# Patient Record
Sex: FEMALE | Race: WHITE | NOT HISPANIC OR LATINO | Employment: FULL TIME | URBAN - METROPOLITAN AREA
[De-identification: names, ages, dates, MRNs, and addresses within clinical notes are randomized per-mention and may not be internally consistent; named-entity substitution may affect disease eponyms.]

---

## 2021-09-15 ENCOUNTER — EVALUATION (OUTPATIENT)
Dept: PHYSICAL THERAPY | Facility: CLINIC | Age: 36
End: 2021-09-15
Payer: COMMERCIAL

## 2021-09-15 DIAGNOSIS — G89.29 CHRONIC PAIN OF LEFT KNEE: Primary | ICD-10-CM

## 2021-09-15 DIAGNOSIS — M25.562 CHRONIC PAIN OF LEFT KNEE: Primary | ICD-10-CM

## 2021-09-15 DIAGNOSIS — M70.50 PES ANSERINE BURSITIS: ICD-10-CM

## 2021-09-15 PROCEDURE — 97110 THERAPEUTIC EXERCISES: CPT | Performed by: PHYSICAL THERAPIST

## 2021-09-15 PROCEDURE — 97161 PT EVAL LOW COMPLEX 20 MIN: CPT | Performed by: PHYSICAL THERAPIST

## 2021-09-15 RX ORDER — LEVOTHYROXINE SODIUM 0.07 MG/1
88 TABLET ORAL DAILY
COMMUNITY
End: 2021-11-01

## 2021-09-15 RX ORDER — SPIRONOLACTONE 25 MG/1
50 TABLET ORAL
COMMUNITY
End: 2022-03-24 | Stop reason: DRUGHIGH

## 2021-09-15 RX ORDER — NORTRIPTYLINE HYDROCHLORIDE 10 MG/1
CAPSULE ORAL
COMMUNITY

## 2021-09-15 NOTE — PROGRESS NOTES
PT Evaluation     Today's date: 9/15/2021  Patient name: Dee Dee Connor  : 1985  MRN: [id number]  Referring provider: No ref  provider found  Dx:   Encounter Diagnosis     ICD-10-CM    1  Chronic pain of left knee  M25 562     G89 29    2  Pes anserine bursitis  M70 50                   Assessment  Assessment details: Dee Dee Connor is a 37 y o  female who presents with complaints of left knee pain and signs and symptoms consistent with pes anserine tendonitis and patellofemoral syndrome  Patient demonstrates very mild decrease in LE strength, mild decreased flexibility, decreased balance, decreased activity tolerance and decreased function  Patient would benefit from skilled physical therapy in order to address said deficits and improve functional mobility     Impairments: abnormal gait, abnormal or restricted ROM, abnormal movement, activity intolerance, impaired balance, impaired physical strength, lacks appropriate home exercise program, pain with function, weight-bearing intolerance and poor posture   Understanding of Dx/Px/POC: good   Prognosis: good    Goals  STG:  Decrease pain 1 grade  Independent with HEP  Pt will run 1 mile without difficulty     LTG:  Decrease pain 2 grades  Increase FOTO to expected score  Pt will be able to run up to 2 miles without difficulty   Negotiate stairs without pain   Pt will report no pain post workout    Plan  Patient would benefit from: skilled physical therapy  Planned therapy interventions: abdominal trunk stabilization, manual therapy, activity modification, joint mobilization, massage, neuromuscular re-education, patient education, postural training, balance, stretching, strengthening, therapeutic activities, therapeutic exercise, flexibility, gait training, functional ROM exercises and home exercise program  Frequency: 2x week  Duration in visits: 8  Treatment plan discussed with: patient        Subjective Evaluation    History of Present Illness  Mechanism of injury: Patient reports chronic left knee pain especially after 1 hour or > of activity and notices its worse mainly the next day  Increased pain mostly going down steps but sometimes it is going up stairs ( can perform reciprocally)   Denies popping, clicking, catching, giving way  Has had Xrays previously, no MRI  Not preventing from sleeping  Denies n/t, - bowl/bladder changes  No prior injection    Prior history of labral repair on hip 2017 on left side, and left foot surgery metatarsal bone cyst removal  With pain high on balls of feet  1 hour biking/wk mostly flat > 90 mins or HIIT  Has had 4 weeks of PT previously  She feels slight improvement in knee pain with PT recently  Pain  Current pain ratin  At worst pain ratin    Patient Goals  Patient goals for therapy: decreased pain and independence with ADLs/IADLs  Patient goal: Reume working wout without restriction, resume running 3-4, increasing intesity of hiking or biking        Objective     Tenderness   Left Knee   Tenderness in the inferior patella, lateral patella, pes anserinus, quadriceps tendon and superior patella  Additional Tenderness Details  Tender at ITB  On left     Active Range of Motion   Left Knee   Normal active range of motion    Right Knee   Normal active range of motion    Additional Active Range of Motion Details  Mild quad tightness b/l no pain  Min hamstring tightness b/l     Strength/Myotome Testing     Left Knee   Normal strength    Right Knee   Normal strength    Additional Strength Details  SLR flexion 4/5              Precautions: None         Manuals 9/15            Laser to pes anserine and quad tendon  NV            graston to pes anserine and  Quad tendon NV            therabar roller to ITB NV            McConnel Taping?              Neuro Re-Ed              y balance NV            steamboats             SLS foam NV            Eccentric step down with TB vlagus pull Ther Ex             Bike/TM NV            ITB strap stretch NV            Prone quad stretch strap NV            glute bridge with band  NV            Piriformis knee to shoulder stretch NV            Clamshells  NV            SLR flexion NV            SLR extension NV            Side step TB NV            Knee ext machine             Ham curl machine             Leg press with TB             Modified pistol at high low                                       Ther Activity             Slider reverse lunges             Slider lateral lunges                                                    Modalities

## 2021-09-17 ENCOUNTER — OFFICE VISIT (OUTPATIENT)
Dept: PHYSICAL THERAPY | Facility: CLINIC | Age: 36
End: 2021-09-17
Payer: COMMERCIAL

## 2021-09-17 DIAGNOSIS — M70.50 PES ANSERINE BURSITIS: ICD-10-CM

## 2021-09-17 DIAGNOSIS — M25.562 CHRONIC PAIN OF LEFT KNEE: Primary | ICD-10-CM

## 2021-09-17 DIAGNOSIS — G89.29 CHRONIC PAIN OF LEFT KNEE: Primary | ICD-10-CM

## 2021-09-17 PROCEDURE — 97110 THERAPEUTIC EXERCISES: CPT | Performed by: PHYSICAL THERAPIST

## 2021-09-17 PROCEDURE — 97140 MANUAL THERAPY 1/> REGIONS: CPT | Performed by: PHYSICAL THERAPIST

## 2021-09-17 PROCEDURE — 97112 NEUROMUSCULAR REEDUCATION: CPT | Performed by: PHYSICAL THERAPIST

## 2021-09-17 NOTE — PROGRESS NOTES
Daily Note     Today's date: 2021  Patient name: Arianna   : 1985  MRN: [id number]  Referring provider: Mimi Crisostomo  Dx:   Encounter Diagnosis     ICD-10-CM    1  Chronic pain of left knee  M25 562     G89 29    2  Pes anserine bursitis  M70 50                   Subjective: Patient reports no pain after IE or difficulty with HEP  Objective: See treatment diary below      Assessment: Tolerated treatment well  Patient would benefit from continued PT  She has good form with all exercises, minimal instability with balance exercise and appears fairly equal bilaterally  Increased lateral hip/glute fatigue with strengthening exercises today  No excessive heat or discomfort with manuals  Continue progressing as tolerated  Plan: Progress treatment as tolerated  Precautions: None         Manuals 9/15 9/17           Laser to pes anserine and quad tendon  NV 3'           graston to pes anserine and  Quad tendon NV 8'           therabar roller to ITB  NV           McConnel Taping?              Neuro Re-Ed              y balance NV 5 x bl            steamboats             SLS foam NV 15" x5 bl biodex foam            Eccentric step down with TB vlagus pull                                                    Ther Ex             Bike/TM NV 5' lvl 5            ITB strap stretch NV            Prone quad stretch strap NV            glute bridge with band  NV  G TB 2x10            Piriformis knee to shoulder stretch NV 20" x3           Clamshells  NV 2x10 G TB            SLR flexion NV            SLR extension NV            Side step TB NV G TB   1'           Knee ext machine             Ham curl machine             Leg press with TB             Modified pistol at high low             Stork to chair                           Ther Activity             Slider reverse lunges             Slider lateral lunges                                                    Modalities

## 2021-09-22 ENCOUNTER — OFFICE VISIT (OUTPATIENT)
Dept: PHYSICAL THERAPY | Facility: CLINIC | Age: 36
End: 2021-09-22
Payer: COMMERCIAL

## 2021-09-22 DIAGNOSIS — G89.29 CHRONIC PAIN OF LEFT KNEE: Primary | ICD-10-CM

## 2021-09-22 DIAGNOSIS — M70.50 PES ANSERINE BURSITIS: ICD-10-CM

## 2021-09-22 DIAGNOSIS — M25.562 CHRONIC PAIN OF LEFT KNEE: Primary | ICD-10-CM

## 2021-09-22 PROCEDURE — 97140 MANUAL THERAPY 1/> REGIONS: CPT

## 2021-09-22 PROCEDURE — 97112 NEUROMUSCULAR REEDUCATION: CPT

## 2021-09-22 PROCEDURE — 97110 THERAPEUTIC EXERCISES: CPT

## 2021-09-22 NOTE — PROGRESS NOTES
Daily Note     Today's date: 2021  Patient name: Bhanu Salazar  : 1985  MRN: [id number]  Referring provider: Anirudh Rizvi  Dx:   Encounter Diagnosis     ICD-10-CM    1  Chronic pain of left knee  M25 562     G89 29    2  Pes anserine bursitis  M70 50                   Subjective: Patient reports that she is doing well today with no complaints of pain  Notes that she felt good following last session  Objective: See treatment diary below      Assessment: Tolerated treatment well  Patient would benefit from continued PT  Continued to focus on glute strengthening this session with muscular fatigue and weakness present  Added in active leg raises being challenged but able to complete  Educated pt on DOMS with compliance present  Fatigued to end with minimal soreness over L adductors  Plan: Progress treatment as tolerated  Precautions: None         Manuals 9/15 9/17 9/22          Laser to pes anserine and quad tendon  NV 3' 3'          graston to pes anserine and  Quad tendon NV 8' 8'          therabar roller to ITB  NV 4'          McConnel Taping?              Neuro Re-Ed              y balance NV 5 x bl            steamboats             SLS foam NV 15" x5 bl biodex foam  15"x5 bl biodex foam          Eccentric step down with TB vlagus pull                                                    Ther Ex             Bike/TM NV 5' lvl 5  5' lv 5          ITB strap stretch NV            Prone quad stretch strap NV            glute bridge with band  NV  GTB 2x10  GTB 2x10          Piriformis knee to shoulder stretch NV 20" x3 20"x3          Clamshells  NV 2x10 G TB  2x10 GTB          SLR flexion NV  2# 2x10          SLR extension NV  2# 2x10          Side step TB NV G TB   1' GTB 4 laps 20'          Knee ext machine             Ham curl machine             Leg press with TB             Modified pistol at high low             Stork to chair                           Ther Activity Slider reverse lunges             Slider lateral lunges                                                    Modalities

## 2021-09-24 ENCOUNTER — OFFICE VISIT (OUTPATIENT)
Dept: PHYSICAL THERAPY | Facility: CLINIC | Age: 36
End: 2021-09-24
Payer: COMMERCIAL

## 2021-09-24 DIAGNOSIS — M25.562 CHRONIC PAIN OF LEFT KNEE: Primary | ICD-10-CM

## 2021-09-24 DIAGNOSIS — G89.29 CHRONIC PAIN OF LEFT KNEE: Primary | ICD-10-CM

## 2021-09-24 DIAGNOSIS — M70.50 PES ANSERINE BURSITIS: ICD-10-CM

## 2021-09-24 PROCEDURE — 97140 MANUAL THERAPY 1/> REGIONS: CPT

## 2021-09-24 PROCEDURE — 97110 THERAPEUTIC EXERCISES: CPT

## 2021-09-24 PROCEDURE — 97112 NEUROMUSCULAR REEDUCATION: CPT

## 2021-09-24 NOTE — PROGRESS NOTES
Daily Note     Today's date: 2021  Patient name: Juancarlos Garcia  : 1985  MRN: [id number]  Referring provider: Bib Lugo  Dx:   Encounter Diagnosis     ICD-10-CM    1  Chronic pain of left knee  M25 562     G89 29    2  Pes anserine bursitis  M70 50                   Subjective: Pt reports no increased sx following last visit  Objective: See treatment diary below      Assessment: Tolerated treatment well  Patient exhibited good technique with therapeutic exercises  Pt defers resistance w/ SLR due to L hip weakness, discomfort  TTP persists L pes anserine, distal adductors, mid ITB  Plan: Progress treatment as tolerated  Precautions: None         Manuals 9/15 9/17 9/22 9/24         Laser to pes anserine and quad tendon  NV 3' 3' 3'         graston to pes anserine and  Quad tendon NV 8' 8' 8'         therabar roller to ITB  NV 4' 4'         McConnel Taping?              Neuro Re-Ed              y balance NV 5 x bl   NV?         steamboats             SLS foam NV 15" x5 bl biodex foam  15"x5 bl biodex foam 15"x3 bl biodex foam         Eccentric step down with TB vlagus pull                                                    Ther Ex             Bike/TM NV 5' lvl 5  5' lv 5 5' lv 5         ITB strap stretch NV            Prone quad stretch strap NV            glute bridge with band  NV  GTB 2x10  GTB 2x10 GTB 2x10         Piriformis knee to shoulder stretch NV 20" x3 20"x3 20"x3         Clamshells  NV 2x10 G TB  2x10 GTB GTb 2x10         SLR flexion NV  2# 2x10 0# 2x10 ea         SLR extension NV  2# 2x10 2# 2x10         Side step TB NV G TB   1' GTB 4 laps 20' GTb 4 laps          Knee ext machine             Ham curl machine             Leg press with TB             Modified pistol at high low             Stork to chair                           Ther Activity             Slider reverse lunges             Slider lateral lunges Modalities

## 2021-09-29 ENCOUNTER — OFFICE VISIT (OUTPATIENT)
Dept: PHYSICAL THERAPY | Facility: CLINIC | Age: 36
End: 2021-09-29
Payer: COMMERCIAL

## 2021-09-29 DIAGNOSIS — G89.29 CHRONIC PAIN OF LEFT KNEE: Primary | ICD-10-CM

## 2021-09-29 DIAGNOSIS — M70.50 PES ANSERINE BURSITIS: ICD-10-CM

## 2021-09-29 DIAGNOSIS — M25.562 CHRONIC PAIN OF LEFT KNEE: Primary | ICD-10-CM

## 2021-09-29 PROCEDURE — 97110 THERAPEUTIC EXERCISES: CPT | Performed by: PHYSICAL THERAPIST

## 2021-09-29 PROCEDURE — 97140 MANUAL THERAPY 1/> REGIONS: CPT | Performed by: PHYSICAL THERAPIST

## 2021-09-29 PROCEDURE — 97112 NEUROMUSCULAR REEDUCATION: CPT | Performed by: PHYSICAL THERAPIST

## 2021-09-29 NOTE — PROGRESS NOTES
Daily Note     Today's date: 2021  Patient name: Jarrod Ruiz  : 1985  MRN: [id number]  Referring provider: Aleja Figueredo  Dx:   Encounter Diagnosis     ICD-10-CM    1  Chronic pain of left knee  M25 562     G89 29    2  Pes anserine bursitis  M70 50                   Subjective: Patient reports she has gone into a 12 mile bike ride, pretty flat without any pain  She has not done any running yet  Objective: See treatment diary below      Assessment: Tolerated treatment well  Patient exhibited good technique with therapeutic exercises  Patient was challenged by addition of steamboats today  She demonstrates good balance with Storks and had no pain to medial knee with Graston  patient was sore at adductors, added in adductor stretching to facilitate loosening of muscles   Plan: Progress treatment as tolerated  Precautions: None         Manuals 9/15 9/17 9/22 9/24 9/29        Laser to pes anserine and quad tendon  NV 3' 3' 3' 3'        graston to pes anserine and  Quad tendon NV 8' 8' 8' 8'        therabar roller to ITB  NV 4' 4'         Adductor stretching     3'        McConnel Taping?              Neuro Re-Ed              y balance NV 5 x bl   NV?         steamboats     R TB x10 4 way        SLS foam NV 15" x5 bl biodex foam  15"x5 bl biodex foam 15"x3 bl biodex foam         Eccentric step down with TB vlagus pull                                                    Ther Ex             Bike/TM NV 5' lvl 5  5' lv 5 5' lv 5 5' lvl 5         ITB strap stretch NV            Prone quad stretch strap NV            glute bridge with band  NV  GTB 2x10  GTB 2x10 GTB 2x10 HEP        Piriformis knee to shoulder stretch NV 20" x3 20"x3 20"x3         Clamshells  NV 2x10 G TB  2x10 GTB GTb 2x10 HEP         SLR flexion NV  2# 2x10 0# 2x10 ea         SLR extension NV  2# 2x10 2# 2x10         Side step TB NV G TB   1' GTB 4 laps 20' GTb 4 laps  HEP        Knee ext machine     22# x15        Ham curl machine     22# x15        Leg press with TB             Modified pistol at high low             Stork to chair      2x10                      Ther Activity             Slider reverse lunges             Slider lateral lunges                                                    Modalities

## 2021-10-01 ENCOUNTER — OFFICE VISIT (OUTPATIENT)
Dept: PHYSICAL THERAPY | Facility: CLINIC | Age: 36
End: 2021-10-01
Payer: COMMERCIAL

## 2021-10-01 DIAGNOSIS — M25.562 CHRONIC PAIN OF LEFT KNEE: Primary | ICD-10-CM

## 2021-10-01 DIAGNOSIS — M70.50 PES ANSERINE BURSITIS: ICD-10-CM

## 2021-10-01 DIAGNOSIS — G89.29 CHRONIC PAIN OF LEFT KNEE: Primary | ICD-10-CM

## 2021-10-01 PROCEDURE — 97140 MANUAL THERAPY 1/> REGIONS: CPT

## 2021-10-01 PROCEDURE — 97110 THERAPEUTIC EXERCISES: CPT

## 2021-10-01 PROCEDURE — 97112 NEUROMUSCULAR REEDUCATION: CPT

## 2021-10-05 ENCOUNTER — OFFICE VISIT (OUTPATIENT)
Dept: PHYSICAL THERAPY | Facility: CLINIC | Age: 36
End: 2021-10-05
Payer: COMMERCIAL

## 2021-10-05 ENCOUNTER — APPOINTMENT (OUTPATIENT)
Dept: LAB | Facility: CLINIC | Age: 36
End: 2021-10-05
Payer: COMMERCIAL

## 2021-10-05 DIAGNOSIS — G89.29 CHRONIC PAIN OF LEFT KNEE: Primary | ICD-10-CM

## 2021-10-05 DIAGNOSIS — M25.562 CHRONIC PAIN OF LEFT KNEE: Primary | ICD-10-CM

## 2021-10-05 DIAGNOSIS — M70.50 PES ANSERINE BURSITIS: ICD-10-CM

## 2021-10-05 DIAGNOSIS — Z79.899 ENCOUNTER FOR LONG-TERM (CURRENT) USE OF OTHER MEDICATIONS: ICD-10-CM

## 2021-10-05 LAB
ALBUMIN SERPL BCP-MCNC: 3.7 G/DL (ref 3.5–5)
ALP SERPL-CCNC: 55 U/L (ref 46–116)
ALT SERPL W P-5'-P-CCNC: 16 U/L (ref 12–78)
ANION GAP SERPL CALCULATED.3IONS-SCNC: 4 MMOL/L (ref 4–13)
AST SERPL W P-5'-P-CCNC: 14 U/L (ref 5–45)
BILIRUB SERPL-MCNC: 0.53 MG/DL (ref 0.2–1)
BUN SERPL-MCNC: 10 MG/DL (ref 5–25)
CALCIUM SERPL-MCNC: 9.4 MG/DL (ref 8.3–10.1)
CHLORIDE SERPL-SCNC: 105 MMOL/L (ref 100–108)
CO2 SERPL-SCNC: 29 MMOL/L (ref 21–32)
CREAT SERPL-MCNC: 0.94 MG/DL (ref 0.6–1.3)
GFR SERPL CREATININE-BSD FRML MDRD: 79 ML/MIN/1.73SQ M
GLUCOSE P FAST SERPL-MCNC: 86 MG/DL (ref 65–99)
POTASSIUM SERPL-SCNC: 3.7 MMOL/L (ref 3.5–5.3)
PROT SERPL-MCNC: 7.2 G/DL (ref 6.4–8.2)
SODIUM SERPL-SCNC: 138 MMOL/L (ref 136–145)

## 2021-10-05 PROCEDURE — 97116 GAIT TRAINING THERAPY: CPT | Performed by: PHYSICAL THERAPIST

## 2021-10-05 PROCEDURE — 97140 MANUAL THERAPY 1/> REGIONS: CPT | Performed by: PHYSICAL THERAPIST

## 2021-10-05 PROCEDURE — 97110 THERAPEUTIC EXERCISES: CPT | Performed by: PHYSICAL THERAPIST

## 2021-10-05 PROCEDURE — 80053 COMPREHEN METABOLIC PANEL: CPT

## 2021-10-05 PROCEDURE — 36415 COLL VENOUS BLD VENIPUNCTURE: CPT

## 2021-10-08 ENCOUNTER — OFFICE VISIT (OUTPATIENT)
Dept: PHYSICAL THERAPY | Facility: CLINIC | Age: 36
End: 2021-10-08
Payer: COMMERCIAL

## 2021-10-08 DIAGNOSIS — M25.562 CHRONIC PAIN OF LEFT KNEE: Primary | ICD-10-CM

## 2021-10-08 DIAGNOSIS — M70.50 PES ANSERINE BURSITIS: ICD-10-CM

## 2021-10-08 DIAGNOSIS — G89.29 CHRONIC PAIN OF LEFT KNEE: Primary | ICD-10-CM

## 2021-10-08 PROCEDURE — 97140 MANUAL THERAPY 1/> REGIONS: CPT

## 2021-10-08 PROCEDURE — 97530 THERAPEUTIC ACTIVITIES: CPT

## 2021-10-08 PROCEDURE — 97110 THERAPEUTIC EXERCISES: CPT

## 2021-10-12 ENCOUNTER — OFFICE VISIT (OUTPATIENT)
Dept: PHYSICAL THERAPY | Facility: CLINIC | Age: 36
End: 2021-10-12
Payer: COMMERCIAL

## 2021-10-12 DIAGNOSIS — G89.29 CHRONIC PAIN OF LEFT KNEE: Primary | ICD-10-CM

## 2021-10-12 DIAGNOSIS — M70.50 PES ANSERINE BURSITIS: ICD-10-CM

## 2021-10-12 DIAGNOSIS — M25.562 CHRONIC PAIN OF LEFT KNEE: Primary | ICD-10-CM

## 2021-10-12 PROCEDURE — 97140 MANUAL THERAPY 1/> REGIONS: CPT | Performed by: PHYSICAL THERAPIST

## 2021-10-12 PROCEDURE — 97110 THERAPEUTIC EXERCISES: CPT | Performed by: PHYSICAL THERAPIST

## 2021-10-12 PROCEDURE — 97530 THERAPEUTIC ACTIVITIES: CPT | Performed by: PHYSICAL THERAPIST

## 2021-10-15 ENCOUNTER — OFFICE VISIT (OUTPATIENT)
Dept: PHYSICAL THERAPY | Facility: CLINIC | Age: 36
End: 2021-10-15
Payer: COMMERCIAL

## 2021-10-15 DIAGNOSIS — M25.562 CHRONIC PAIN OF LEFT KNEE: Primary | ICD-10-CM

## 2021-10-15 DIAGNOSIS — M70.50 PES ANSERINE BURSITIS: ICD-10-CM

## 2021-10-15 DIAGNOSIS — G89.29 CHRONIC PAIN OF LEFT KNEE: Primary | ICD-10-CM

## 2021-10-15 PROCEDURE — 97530 THERAPEUTIC ACTIVITIES: CPT

## 2021-10-15 PROCEDURE — 97110 THERAPEUTIC EXERCISES: CPT

## 2021-10-15 PROCEDURE — 97140 MANUAL THERAPY 1/> REGIONS: CPT

## 2021-10-20 ENCOUNTER — EVALUATION (OUTPATIENT)
Dept: PHYSICAL THERAPY | Facility: CLINIC | Age: 36
End: 2021-10-20
Payer: COMMERCIAL

## 2021-10-20 DIAGNOSIS — M25.562 CHRONIC PAIN OF LEFT KNEE: Primary | ICD-10-CM

## 2021-10-20 DIAGNOSIS — M70.50 PES ANSERINE BURSITIS: ICD-10-CM

## 2021-10-20 DIAGNOSIS — G89.29 CHRONIC PAIN OF LEFT KNEE: Primary | ICD-10-CM

## 2021-10-20 PROCEDURE — 97140 MANUAL THERAPY 1/> REGIONS: CPT | Performed by: PHYSICAL THERAPIST

## 2021-10-20 PROCEDURE — 97530 THERAPEUTIC ACTIVITIES: CPT | Performed by: PHYSICAL THERAPIST

## 2021-10-20 PROCEDURE — 97110 THERAPEUTIC EXERCISES: CPT | Performed by: PHYSICAL THERAPIST

## 2021-10-26 ENCOUNTER — OFFICE VISIT (OUTPATIENT)
Dept: PHYSICAL THERAPY | Facility: CLINIC | Age: 36
End: 2021-10-26
Payer: COMMERCIAL

## 2021-10-26 DIAGNOSIS — M70.50 PES ANSERINE BURSITIS: ICD-10-CM

## 2021-10-26 DIAGNOSIS — G89.29 CHRONIC PAIN OF LEFT KNEE: Primary | ICD-10-CM

## 2021-10-26 DIAGNOSIS — M25.562 CHRONIC PAIN OF LEFT KNEE: Primary | ICD-10-CM

## 2021-10-26 PROCEDURE — 97140 MANUAL THERAPY 1/> REGIONS: CPT

## 2021-10-26 PROCEDURE — 97112 NEUROMUSCULAR REEDUCATION: CPT

## 2021-10-26 PROCEDURE — 97530 THERAPEUTIC ACTIVITIES: CPT

## 2021-10-26 PROCEDURE — 97110 THERAPEUTIC EXERCISES: CPT

## 2021-10-29 ENCOUNTER — OFFICE VISIT (OUTPATIENT)
Dept: PHYSICAL THERAPY | Facility: CLINIC | Age: 36
End: 2021-10-29
Payer: COMMERCIAL

## 2021-10-29 DIAGNOSIS — G89.29 CHRONIC PAIN OF LEFT KNEE: Primary | ICD-10-CM

## 2021-10-29 DIAGNOSIS — M25.562 CHRONIC PAIN OF LEFT KNEE: Primary | ICD-10-CM

## 2021-10-29 DIAGNOSIS — M70.50 PES ANSERINE BURSITIS: ICD-10-CM

## 2021-10-29 PROCEDURE — 97530 THERAPEUTIC ACTIVITIES: CPT

## 2021-10-29 PROCEDURE — 97110 THERAPEUTIC EXERCISES: CPT

## 2021-10-29 PROCEDURE — 97140 MANUAL THERAPY 1/> REGIONS: CPT

## 2021-10-29 PROCEDURE — 97112 NEUROMUSCULAR REEDUCATION: CPT

## 2021-11-01 ENCOUNTER — OFFICE VISIT (OUTPATIENT)
Dept: INTERNAL MEDICINE CLINIC | Facility: CLINIC | Age: 36
End: 2021-11-01
Payer: COMMERCIAL

## 2021-11-01 VITALS
HEART RATE: 80 BPM | WEIGHT: 136 LBS | SYSTOLIC BLOOD PRESSURE: 110 MMHG | BODY MASS INDEX: 25.03 KG/M2 | OXYGEN SATURATION: 98 % | DIASTOLIC BLOOD PRESSURE: 66 MMHG | HEIGHT: 62 IN

## 2021-11-01 DIAGNOSIS — G43.109 MIGRAINE WITH AURA AND WITHOUT STATUS MIGRAINOSUS, NOT INTRACTABLE: ICD-10-CM

## 2021-11-01 DIAGNOSIS — Z11.4 SCREENING FOR HIV (HUMAN IMMUNODEFICIENCY VIRUS): ICD-10-CM

## 2021-11-01 DIAGNOSIS — E03.9 HYPOTHYROIDISM, UNSPECIFIED TYPE: Primary | ICD-10-CM

## 2021-11-01 DIAGNOSIS — Z23 NEED FOR VACCINATION: ICD-10-CM

## 2021-11-01 DIAGNOSIS — L70.0 ACNE VULGARIS: ICD-10-CM

## 2021-11-01 DIAGNOSIS — Z11.59 NEED FOR HEPATITIS C SCREENING TEST: ICD-10-CM

## 2021-11-01 DIAGNOSIS — K58.0 IRRITABLE BOWEL SYNDROME WITH DIARRHEA: ICD-10-CM

## 2021-11-01 DIAGNOSIS — Z12.4 SCREENING FOR CERVICAL CANCER: ICD-10-CM

## 2021-11-01 PROBLEM — K58.9 IBS (IRRITABLE BOWEL SYNDROME): Status: ACTIVE | Noted: 2020-01-24

## 2021-11-01 PROCEDURE — 99204 OFFICE O/P NEW MOD 45 MIN: CPT | Performed by: INTERNAL MEDICINE

## 2021-11-01 PROCEDURE — 3725F SCREEN DEPRESSION PERFORMED: CPT | Performed by: INTERNAL MEDICINE

## 2021-11-01 PROCEDURE — 90471 IMMUNIZATION ADMIN: CPT

## 2021-11-01 PROCEDURE — 90686 IIV4 VACC NO PRSV 0.5 ML IM: CPT

## 2021-11-01 RX ORDER — NAPROXEN 500 MG/1
TABLET ORAL
COMMUNITY
End: 2021-11-01 | Stop reason: ALTCHOICE

## 2021-11-01 RX ORDER — LEVOTHYROXINE SODIUM 88 UG/1
88 TABLET ORAL DAILY
Start: 2021-11-01

## 2021-11-01 RX ORDER — NORETHINDRONE ACETATE AND ETHINYL ESTRADIOL AND FERROUS FUMARATE 5-7-9-7
1 KIT ORAL
COMMUNITY
End: 2021-11-01 | Stop reason: ALTCHOICE

## 2021-11-01 RX ORDER — TRETINOIN 0.1 MG/G
GEL TOPICAL
COMMUNITY
End: 2021-11-30 | Stop reason: ALTCHOICE

## 2021-11-01 RX ORDER — SUMATRIPTAN 100 MG/1
100 TABLET, FILM COATED ORAL ONCE AS NEEDED
Qty: 10 TABLET | Refills: 0 | Status: SHIPPED | OUTPATIENT
Start: 2021-11-01 | End: 2021-12-01

## 2021-11-01 RX ORDER — IVERMECTIN 10 MG/G
CREAM TOPICAL
COMMUNITY
Start: 2021-10-11

## 2021-11-01 RX ORDER — ONDANSETRON 4 MG/1
4 TABLET, ORALLY DISINTEGRATING ORAL EVERY 6 HOURS PRN
Qty: 10 TABLET | Refills: 0 | Status: SHIPPED | OUTPATIENT
Start: 2021-11-01

## 2021-11-02 ENCOUNTER — OFFICE VISIT (OUTPATIENT)
Dept: PHYSICAL THERAPY | Facility: CLINIC | Age: 36
End: 2021-11-02
Payer: COMMERCIAL

## 2021-11-02 DIAGNOSIS — M70.50 PES ANSERINE BURSITIS: ICD-10-CM

## 2021-11-02 DIAGNOSIS — G89.29 CHRONIC PAIN OF LEFT KNEE: Primary | ICD-10-CM

## 2021-11-02 DIAGNOSIS — M25.562 CHRONIC PAIN OF LEFT KNEE: Primary | ICD-10-CM

## 2021-11-02 PROCEDURE — 97140 MANUAL THERAPY 1/> REGIONS: CPT | Performed by: PHYSICAL THERAPIST

## 2021-11-02 PROCEDURE — 97110 THERAPEUTIC EXERCISES: CPT | Performed by: PHYSICAL THERAPIST

## 2021-11-02 PROCEDURE — 97530 THERAPEUTIC ACTIVITIES: CPT | Performed by: PHYSICAL THERAPIST

## 2021-11-03 ENCOUNTER — OFFICE VISIT (OUTPATIENT)
Dept: OBGYN CLINIC | Facility: HOSPITAL | Age: 36
End: 2021-11-03
Payer: COMMERCIAL

## 2021-11-03 ENCOUNTER — HOSPITAL ENCOUNTER (OUTPATIENT)
Dept: RADIOLOGY | Facility: HOSPITAL | Age: 36
Discharge: HOME | End: 2021-11-03
Attending: ORTHOPAEDIC SURGERY
Payer: COMMERCIAL

## 2021-11-03 VITALS
WEIGHT: 137.6 LBS | BODY MASS INDEX: 25.32 KG/M2 | SYSTOLIC BLOOD PRESSURE: 114 MMHG | DIASTOLIC BLOOD PRESSURE: 76 MMHG | HEART RATE: 72 BPM | HEIGHT: 62 IN

## 2021-11-03 DIAGNOSIS — M25.562 LEFT KNEE PAIN, UNSPECIFIED CHRONICITY: ICD-10-CM

## 2021-11-03 DIAGNOSIS — M25.562 LEFT KNEE PAIN, UNSPECIFIED CHRONICITY: Primary | ICD-10-CM

## 2021-11-03 DIAGNOSIS — M23.92 KNEE INTERNAL DERANGEMENT, LEFT: ICD-10-CM

## 2021-11-03 DIAGNOSIS — S83.207A MCMURRAY TEST POSITIVE, LEFT, INITIAL ENCOUNTER: ICD-10-CM

## 2021-11-03 PROCEDURE — 73562 X-RAY EXAM OF KNEE 3: CPT

## 2021-11-03 PROCEDURE — 99203 OFFICE O/P NEW LOW 30 MIN: CPT | Performed by: ORTHOPAEDIC SURGERY

## 2021-11-04 ENCOUNTER — OFFICE VISIT (OUTPATIENT)
Dept: PHYSICAL THERAPY | Facility: CLINIC | Age: 36
End: 2021-11-04
Payer: COMMERCIAL

## 2021-11-04 DIAGNOSIS — M70.50 PES ANSERINE BURSITIS: ICD-10-CM

## 2021-11-04 DIAGNOSIS — M25.562 CHRONIC PAIN OF LEFT KNEE: Primary | ICD-10-CM

## 2021-11-04 DIAGNOSIS — G89.29 CHRONIC PAIN OF LEFT KNEE: Primary | ICD-10-CM

## 2021-11-04 PROCEDURE — 97110 THERAPEUTIC EXERCISES: CPT

## 2021-11-04 PROCEDURE — 97530 THERAPEUTIC ACTIVITIES: CPT

## 2021-11-04 PROCEDURE — 97140 MANUAL THERAPY 1/> REGIONS: CPT

## 2021-11-05 ENCOUNTER — APPOINTMENT (OUTPATIENT)
Dept: PHYSICAL THERAPY | Facility: CLINIC | Age: 36
End: 2021-11-05
Payer: COMMERCIAL

## 2021-11-08 ENCOUNTER — OFFICE VISIT (OUTPATIENT)
Dept: PHYSICAL THERAPY | Facility: CLINIC | Age: 36
End: 2021-11-08
Payer: COMMERCIAL

## 2021-11-08 DIAGNOSIS — M70.50 PES ANSERINE BURSITIS: ICD-10-CM

## 2021-11-08 DIAGNOSIS — G89.29 CHRONIC PAIN OF LEFT KNEE: Primary | ICD-10-CM

## 2021-11-08 DIAGNOSIS — M25.562 CHRONIC PAIN OF LEFT KNEE: Primary | ICD-10-CM

## 2021-11-08 PROCEDURE — 97530 THERAPEUTIC ACTIVITIES: CPT | Performed by: PHYSICAL THERAPIST

## 2021-11-08 PROCEDURE — 97140 MANUAL THERAPY 1/> REGIONS: CPT | Performed by: PHYSICAL THERAPIST

## 2021-11-08 PROCEDURE — 97112 NEUROMUSCULAR REEDUCATION: CPT | Performed by: PHYSICAL THERAPIST

## 2021-11-08 PROCEDURE — 97110 THERAPEUTIC EXERCISES: CPT | Performed by: PHYSICAL THERAPIST

## 2021-11-11 ENCOUNTER — APPOINTMENT (OUTPATIENT)
Dept: PHYSICAL THERAPY | Facility: CLINIC | Age: 36
End: 2021-11-11
Payer: COMMERCIAL

## 2021-11-12 ENCOUNTER — HOSPITAL ENCOUNTER (OUTPATIENT)
Dept: RADIOLOGY | Facility: IMAGING CENTER | Age: 36
Discharge: HOME | End: 2021-11-12
Payer: COMMERCIAL

## 2021-11-12 DIAGNOSIS — M25.562 LEFT KNEE PAIN, UNSPECIFIED CHRONICITY: ICD-10-CM

## 2021-11-12 DIAGNOSIS — M23.92 KNEE INTERNAL DERANGEMENT, LEFT: ICD-10-CM

## 2021-11-12 DIAGNOSIS — S83.207A MCMURRAY TEST POSITIVE, LEFT, INITIAL ENCOUNTER: ICD-10-CM

## 2021-11-12 PROCEDURE — G1004 CDSM NDSC: HCPCS

## 2021-11-12 PROCEDURE — 73721 MRI JNT OF LWR EXTRE W/O DYE: CPT

## 2021-11-16 ENCOUNTER — OFFICE VISIT (OUTPATIENT)
Dept: PHYSICAL THERAPY | Facility: CLINIC | Age: 36
End: 2021-11-16
Payer: COMMERCIAL

## 2021-11-16 DIAGNOSIS — M70.50 PES ANSERINE BURSITIS: ICD-10-CM

## 2021-11-16 DIAGNOSIS — G89.29 CHRONIC PAIN OF LEFT KNEE: Primary | ICD-10-CM

## 2021-11-16 DIAGNOSIS — M25.562 CHRONIC PAIN OF LEFT KNEE: Primary | ICD-10-CM

## 2021-11-16 PROCEDURE — 97140 MANUAL THERAPY 1/> REGIONS: CPT | Performed by: PHYSICAL THERAPIST

## 2021-11-16 PROCEDURE — 97530 THERAPEUTIC ACTIVITIES: CPT | Performed by: PHYSICAL THERAPIST

## 2021-11-16 PROCEDURE — 97112 NEUROMUSCULAR REEDUCATION: CPT | Performed by: PHYSICAL THERAPIST

## 2021-11-16 PROCEDURE — 97110 THERAPEUTIC EXERCISES: CPT | Performed by: PHYSICAL THERAPIST

## 2021-11-19 ENCOUNTER — APPOINTMENT (OUTPATIENT)
Dept: PHYSICAL THERAPY | Facility: CLINIC | Age: 36
End: 2021-11-19
Payer: COMMERCIAL

## 2021-11-23 ENCOUNTER — EVALUATION (OUTPATIENT)
Dept: PHYSICAL THERAPY | Facility: CLINIC | Age: 36
End: 2021-11-23
Payer: COMMERCIAL

## 2021-11-23 DIAGNOSIS — G89.29 CHRONIC PAIN OF LEFT KNEE: Primary | ICD-10-CM

## 2021-11-23 DIAGNOSIS — M25.562 CHRONIC PAIN OF LEFT KNEE: Primary | ICD-10-CM

## 2021-11-23 DIAGNOSIS — M70.50 PES ANSERINE BURSITIS: ICD-10-CM

## 2021-11-23 PROCEDURE — 97530 THERAPEUTIC ACTIVITIES: CPT | Performed by: PHYSICAL THERAPIST

## 2021-11-23 PROCEDURE — 97140 MANUAL THERAPY 1/> REGIONS: CPT | Performed by: PHYSICAL THERAPIST

## 2021-11-23 PROCEDURE — 97112 NEUROMUSCULAR REEDUCATION: CPT | Performed by: PHYSICAL THERAPIST

## 2021-11-23 PROCEDURE — 97110 THERAPEUTIC EXERCISES: CPT | Performed by: PHYSICAL THERAPIST

## 2021-11-30 ENCOUNTER — OFFICE VISIT (OUTPATIENT)
Dept: OBGYN CLINIC | Facility: CLINIC | Age: 36
End: 2021-11-30
Payer: COMMERCIAL

## 2021-11-30 VITALS
SYSTOLIC BLOOD PRESSURE: 110 MMHG | DIASTOLIC BLOOD PRESSURE: 78 MMHG | HEIGHT: 62 IN | BODY MASS INDEX: 25.03 KG/M2 | WEIGHT: 136 LBS

## 2021-11-30 DIAGNOSIS — Z12.4 SCREENING FOR CERVICAL CANCER: ICD-10-CM

## 2021-11-30 DIAGNOSIS — Z01.419 WOMEN'S ANNUAL ROUTINE GYNECOLOGICAL EXAMINATION: Primary | ICD-10-CM

## 2021-11-30 DIAGNOSIS — Z30.09 BIRTH CONTROL COUNSELING: ICD-10-CM

## 2021-11-30 PROCEDURE — 1036F TOBACCO NON-USER: CPT | Performed by: PHYSICIAN ASSISTANT

## 2021-11-30 PROCEDURE — 3008F BODY MASS INDEX DOCD: CPT | Performed by: PHYSICIAN ASSISTANT

## 2021-11-30 PROCEDURE — 99385 PREV VISIT NEW AGE 18-39: CPT | Performed by: PHYSICIAN ASSISTANT

## 2021-11-30 RX ORDER — LACTIC ACID, L-, CITRIC ACID MONOHYDRATE, AND POTASSIUM BITARTRATE 90; 50; 20 MG/5G; MG/5G; MG/5G
1 GEL VAGINAL DAILY
Qty: 30 G | Refills: 0 | Status: SHIPPED | OUTPATIENT
Start: 2021-11-30 | End: 2021-12-30

## 2021-12-01 ENCOUNTER — APPOINTMENT (OUTPATIENT)
Dept: LAB | Facility: CLINIC | Age: 36
End: 2021-12-01
Payer: COMMERCIAL

## 2021-12-01 DIAGNOSIS — Z79.899 ENCOUNTER FOR LONG-TERM (CURRENT) USE OF OTHER MEDICATIONS: ICD-10-CM

## 2021-12-01 DIAGNOSIS — G43.109 MIGRAINE WITH AURA AND WITHOUT STATUS MIGRAINOSUS, NOT INTRACTABLE: ICD-10-CM

## 2021-12-01 LAB
ALBUMIN SERPL BCP-MCNC: 3.7 G/DL (ref 3.5–5)
ALP SERPL-CCNC: 47 U/L (ref 46–116)
ALT SERPL W P-5'-P-CCNC: 24 U/L (ref 12–78)
ANION GAP SERPL CALCULATED.3IONS-SCNC: 5 MMOL/L (ref 4–13)
AST SERPL W P-5'-P-CCNC: 19 U/L (ref 5–45)
BASOPHILS # BLD AUTO: 0.05 THOUSANDS/ΜL (ref 0–0.1)
BASOPHILS NFR BLD AUTO: 1 % (ref 0–1)
BILIRUB SERPL-MCNC: 0.54 MG/DL (ref 0.2–1)
BUN SERPL-MCNC: 13 MG/DL (ref 5–25)
CALCIUM SERPL-MCNC: 8.8 MG/DL (ref 8.3–10.1)
CHLORIDE SERPL-SCNC: 106 MMOL/L (ref 100–108)
CHOLEST SERPL-MCNC: 190 MG/DL
CO2 SERPL-SCNC: 28 MMOL/L (ref 21–32)
CREAT SERPL-MCNC: 1.02 MG/DL (ref 0.6–1.3)
EOSINOPHIL # BLD AUTO: 0.15 THOUSAND/ΜL (ref 0–0.61)
EOSINOPHIL NFR BLD AUTO: 2 % (ref 0–6)
ERYTHROCYTE [DISTWIDTH] IN BLOOD BY AUTOMATED COUNT: 12.5 % (ref 11.6–15.1)
GLUCOSE P FAST SERPL-MCNC: 83 MG/DL (ref 65–99)
HCT VFR BLD AUTO: 43.7 % (ref 36.5–46.1)
HCV AB SER QL: NORMAL
HDLC SERPL-MCNC: 61 MG/DL
HGB BLD-MCNC: 14.2 G/DL (ref 12–15.4)
IMM GRANULOCYTES # BLD AUTO: 0.02 THOUSAND/UL (ref 0–0.2)
IMM GRANULOCYTES NFR BLD AUTO: 0 % (ref 0–2)
LDLC SERPL CALC-MCNC: 113 MG/DL (ref 0–100)
LYMPHOCYTES # BLD AUTO: 2.48 THOUSANDS/ΜL (ref 0.6–4.47)
LYMPHOCYTES NFR BLD AUTO: 40 % (ref 14–44)
MCH RBC QN AUTO: 30.3 PG (ref 26.8–34.3)
MCHC RBC AUTO-ENTMCNC: 32.5 G/DL (ref 31.4–37.4)
MCV RBC AUTO: 93 FL (ref 82–98)
MONOCYTES # BLD AUTO: 0.51 THOUSAND/ΜL (ref 0.17–1.22)
MONOCYTES NFR BLD AUTO: 8 % (ref 4–12)
NEUTROPHILS # BLD AUTO: 2.93 THOUSANDS/ΜL (ref 1.85–7.62)
NEUTS SEG NFR BLD AUTO: 49 % (ref 43–75)
NRBC BLD AUTO-RTO: 0 /100 WBCS
PLATELET # BLD AUTO: 234 THOUSANDS/UL (ref 149–390)
PMV BLD AUTO: 11 FL (ref 8.9–12.7)
POTASSIUM SERPL-SCNC: 3.7 MMOL/L (ref 3.5–5.3)
PROT SERPL-MCNC: 7.4 G/DL (ref 6.4–8.2)
RBC # BLD AUTO: 4.68 MILLION/UL (ref 3.88–5.12)
SODIUM SERPL-SCNC: 139 MMOL/L (ref 136–145)
TRIGL SERPL-MCNC: 78 MG/DL
TSH SERPL DL<=0.05 MIU/L-ACNC: 1.12 UIU/ML (ref 0.36–3.74)
WBC # BLD AUTO: 6.14 THOUSAND/UL (ref 4.31–10.16)

## 2021-12-01 PROCEDURE — 87389 HIV-1 AG W/HIV-1&-2 AB AG IA: CPT | Performed by: INTERNAL MEDICINE

## 2021-12-01 PROCEDURE — 85025 COMPLETE CBC W/AUTO DIFF WBC: CPT | Performed by: INTERNAL MEDICINE

## 2021-12-01 PROCEDURE — 86803 HEPATITIS C AB TEST: CPT | Performed by: INTERNAL MEDICINE

## 2021-12-01 PROCEDURE — 80061 LIPID PANEL: CPT | Performed by: INTERNAL MEDICINE

## 2021-12-01 PROCEDURE — 84443 ASSAY THYROID STIM HORMONE: CPT | Performed by: INTERNAL MEDICINE

## 2021-12-01 PROCEDURE — 80053 COMPREHEN METABOLIC PANEL: CPT | Performed by: INTERNAL MEDICINE

## 2021-12-01 PROCEDURE — 36415 COLL VENOUS BLD VENIPUNCTURE: CPT | Performed by: INTERNAL MEDICINE

## 2021-12-01 RX ORDER — SUMATRIPTAN 100 MG/1
100 TABLET, FILM COATED ORAL ONCE AS NEEDED
Qty: 10 TABLET | Refills: 0 | Status: SHIPPED | OUTPATIENT
Start: 2021-12-01

## 2021-12-02 LAB — HIV 1+2 AB+HIV1 P24 AG SERPL QL IA: NORMAL

## 2021-12-07 ENCOUNTER — DOCUMENTATION (OUTPATIENT)
Dept: OBGYN CLINIC | Facility: CLINIC | Age: 36
End: 2021-12-07

## 2021-12-16 ENCOUNTER — PATIENT MESSAGE (OUTPATIENT)
Dept: OBGYN CLINIC | Facility: CLINIC | Age: 36
End: 2021-12-16

## 2021-12-21 ENCOUNTER — OFFICE VISIT (OUTPATIENT)
Dept: OBGYN CLINIC | Facility: HOSPITAL | Age: 36
End: 2021-12-21
Payer: COMMERCIAL

## 2021-12-21 VITALS
WEIGHT: 134 LBS | HEART RATE: 83 BPM | BODY MASS INDEX: 24.66 KG/M2 | HEIGHT: 62 IN | DIASTOLIC BLOOD PRESSURE: 68 MMHG | SYSTOLIC BLOOD PRESSURE: 100 MMHG

## 2021-12-21 DIAGNOSIS — M22.2X2 PATELLOFEMORAL SYNDROME OF LEFT KNEE: ICD-10-CM

## 2021-12-21 DIAGNOSIS — M25.862 KNEE JOINT CYST, LEFT: Primary | ICD-10-CM

## 2021-12-21 PROCEDURE — 99213 OFFICE O/P EST LOW 20 MIN: CPT | Performed by: ORTHOPAEDIC SURGERY

## 2021-12-21 PROCEDURE — 3008F BODY MASS INDEX DOCD: CPT | Performed by: ORTHOPAEDIC SURGERY

## 2021-12-21 PROCEDURE — 1036F TOBACCO NON-USER: CPT | Performed by: ORTHOPAEDIC SURGERY

## 2022-01-10 ENCOUNTER — PATIENT MESSAGE (OUTPATIENT)
Dept: OBGYN CLINIC | Facility: CLINIC | Age: 37
End: 2022-01-10

## 2022-01-10 NOTE — LETTER
January 11, 2022     [address] [place] [address],  [id number]       To whom it may concern,    Patient has previously tried estrostep FE for many years but stopped due to reported migraines with aura  Patients neurologist recommends that patient does not take any birth control containing estrogen due to migraines with aura and increased risk of mini stroke  Patient also had a Mirena IUD inserted 2016 but was removed 9 months later due to continuous painful cramping  Patients most recent birth control failure was a Superior IUD  The Superior was inserted September 2018 and removed December 2020 due to aggravation of cystic acne while IUD was in place  We recommend and believe Phexxi is the best medication choice for patient due to 3 past birth control failures and inability to use estrogen  If you have any questions or concerns, please reach out to our office         Sincerely,       Kirk Hung PA-C                   CC: No Recipients

## 2022-01-11 ENCOUNTER — OFFICE VISIT (OUTPATIENT)
Dept: DERMATOLOGY | Age: 37
End: 2022-01-11
Payer: COMMERCIAL

## 2022-01-11 VITALS — BODY MASS INDEX: 24.51 KG/M2 | WEIGHT: 134 LBS | TEMPERATURE: 98.2 F

## 2022-01-11 DIAGNOSIS — L70.0 ACNE VULGARIS: Primary | ICD-10-CM

## 2022-01-11 PROCEDURE — 99204 OFFICE O/P NEW MOD 45 MIN: CPT | Performed by: DERMATOLOGY

## 2022-01-11 NOTE — PROGRESS NOTES
[facility] Note     Patient Name: Kolton Romano  Encounter Date: 01/11/22     Have you been cared for by a [facility] Dermatologist in the last 3 years and, if so, which one? No    · Have you traveled outside of the [address] [place] [address] in the past 3 months or outside of the [facility] area in the last 2 weeks? No     May we call your Preferred Phone number to discuss your specific medical information? Yes     May we leave a detailed message that includes your specific medical information? Yes      Today's Chief Concerns:   Concern #1:  Acne/acne scarring    Past Medical History:  Have you personally ever had or currently have any of the following? · Skin cancer (such as Melanoma, Basal Cell Carcinoma, Squamous Cell Carcinoma? (If Yes, please provide more detail)- No  · Eczema: YES  · Psoriasis: No  · HIV/AIDS: No  · Hepatitis B or C: No  · Tuberculosis: No  · Systemic Immunosuppression such as Diabetes, Biologic or Immunotherapy, Chemotherapy, Organ Transplantation, Bone Marrow Transplantation (If YES, please provide more detail): No  · Radiation Treatment (If YES, please provide more detail): No  · Any other major medical conditions/concerns? 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 Running, cycling, drawing    Family History:  Have any of your "first degree relatives" (parent, brother, sister, or child) had any of the following       · Skin cancer such as Melanoma or Merkel Cell Carcinoma or Pancreatic Cancer? YES, paternal grandmother, maternal uncle (unknown types)  · Eczema, Asthma, Hay Fever or Seasonal Allergies: No  · Psoriasis or Psoriatic Arthritis: No  · Do any other medical conditions seem to run in your family? If Yes, what condition and which relatives?   No    Current Medications:         Current Outpatient Medications:     levothyroxine (Euthyrox) 88 mcg tablet, Take 1 tablet (88 mcg total) by mouth daily, Disp: , Rfl:     nortriptyline (PAMELOR) 10 mg capsule, Take by mouth daily at bedtime, Disp: , Rfl:     ondansetron (ZOFRAN-ODT) 4 mg disintegrating tablet, Take 1 tablet (4 mg total) by mouth every 6 (six) hours as needed for nausea or vomiting, Disp: 10 tablet, Rfl: 0    Soolantra 1 % CREA, , Disp: , Rfl:     spironolactone (ALDACTONE) 25 mg tablet, Take 50 mg by mouth daily at bedtime, Disp: , Rfl:     SUMAtriptan (IMITREX) 100 mg tablet, TAKE 1 TABLET (100 MG TOTAL) BY MOUTH ONCE AS NEEDED FOR MIGRAINE FOR UP TO 1 DOSE, Disp: 10 tablet, Rfl: 0    tretinoin (RETIN-A) 0 025 % cream, , Disp: , Rfl:       Review of Systems:  Have you recently had or currently have any of the following? If YES, what are you doing for the problem? · Fever, chills or unintended weight loss: No  · Sudden loss or change in your vision: No  · Nausea, vomiting or blood in your stool: No  · Painful or swollen joints: No  · Wheezing or cough: No  · Changing mole or non-healing wound: No  · Nosebleeds: No  · Excessive sweating: No  · Easy or prolonged bleeding? No  · Over the last 2 weeks, how often have you been bothered by the following problems? · Taking little interest or pleasure in doing things: 1 - Not at All  · Feeling down, depressed, or hopeless: 1 - Not at All  · Rapid heartbeat with epinephrine:  No    · FEMALES ONLY:    · Are you pregnant or planning to become pregnant? No  · Are you currently or planning to be nursing or breast feeding? No    · Any known allergies? No Known Allergies      Physical Exam:     Was a chaperone (Derm Clinical Assistant) present throughout the entire Physical Exam? Yes     Did the Dermatology Team specifically  the patient on the importance of a Full Skin Exam to be sure that nothing is missed clinically?  Yes}  o Did the patient ultimately request or accept a Full Skin Exam?  NO  o Did the patient specifically refuse to have the areas "under-the-bra" examined by the Dermatologist? No  o Did the patient specifically refuse to have the areas "under-the-underwear" examined by the Dermatologist? No    CONSTITUTIONAL:   Vitals:    01/11/22 1309   Temp: 98 2 °F (36 8 °C)   TempSrc: Temporal   Weight: 60 8 kg (134 lb)       PSYCH: Normal mood and affect  EYES: Normal conjunctiva  ENT: Normal lips and oral mucosa  CARDIOVASCULAR: No edema  RESPIRATORY: Normal respirations  HEME/LYMPH/IMMUNO:  No regional lymphadenopathy except as noted below in "ASSESSMENT AND PLAN BY DIAGNOSIS"    SKIN:  FULL ORGAN SYSTEM EXAM   Hair, Scalp, Ears, Face Normal except as noted below in Assessment        Assessment and Plan by Diagnosis:    History of Present Condition:     Duration:  How long has this been an issue for you?    o  many years   Location Affected:  Where on the body is this affecting you?    o  chest and back    Quality:  Is there any bleeding, pain, itch, burning/irritation, or redness associated with the skin lesion? o  denies   Severity:  Describe any bleeding, pain, itch, burning/irritation, or redness on a scale of 1 to 10 (with 10 being the worst)  o  denies   Timing:  Does this condition seem to be there pretty constantly or do you notice it more at specific times throughout the day?     o  tends to flare hormonally   Context:  Have you ever noticed that this condition seems to be associated with specific activities you do?    o  denies   Modifying Factors:    o Anything that seems to make the condition worse?    -  denies  o What have you tried to do to make the condition better?    -  vanicream lotion  - elta md  - Spironolactone - 25 mg twice daily  - Tretinoin 0 025% cream    ACNE VULGARIS ("COMMON ACNE") with ROSACEA    Physical Exam:   Psychiatric/Mood: Normal   Anatomic Location Affected:  Face, chest, back   Morphological Description:  o Open/Closed Comedones:  - Rare ("Almost Clear")  o Inflammatory Papules/Pustules:  - Rare ("Almost Clear")  o Nodules:  - No evidence ("Clear")  o Scarring:  - Few ("Mild")  o Excoriations:  - No evidence ("Clear")  o Local Skin Redness/Erythema:  - Several ("Moderate")  o Local Skin Dryness/Scaling:  - Few ("Mild")  o Local Skin Dyspigmentation:  - Few ("Mild")   Pertinent Positives:   Pertinent Negatives: Additional History of Present Condition:  Has been on Accutane twice  Recently has been using tretinoin 0 025% cream, spironolactone 50 mg daily and has seen improvement  Assessment and Plan:   We reviewed the causes of acne, the kinds of acne, and the expected clinical course   We discussed treatment options ranging from over-the-counter products, topical retinoids, antibiotics, BP, hormonal therapies (OCPs/spironolactone), and isotretinoin (Accutane)   We reviewed specific over-the-counter interventions and medications  Recommended typical hygiene measures including water-based facial products, washing regularly with mild cleanser, and refraining from picking and popping any pimples   Recommended non-comedogenic sunscreen use daily   Expectations of therapy discussed  Side effects, risks and benefits of medications discussed   A comprehensive handout on Acne was provided   The phone number to call in case of questions or concerns (and instructions to stop medications in such a scenario) was provided     After lengthy discussion of etiology and treatment options, we decided to implement the following personalized treatment plan:    Based on a thorough discussion of this condition and the management approach to it (including a comprehensive discussion of the known risks, side effects and potential benefits of treatment), the patient (family) agrees to implement the following specific plan:    --------------------------------------------------------------------------------------  YOUR PERSONALIZED ACNE ACTION PLAN    MORNING ROUTINE    1) SKIN HYGIENE:  In the shower, wash your face, chest and back gently with Cetaphil moisturizing cleanser or Dove Fragrance-free bar  Do not use a luffa or washcloth as these tend to be too irritating to acne-prone skin  2) Soolantra 1% cream - apply topically once daily to face      EVENING ROUTINE    1) SKIN HYGIENE:  In the shower, wash your face, chest and back gently with Cetaphil moisturizing cleanser or Dove Fragrance-free bar  Do not use a lufa or washcloth as these tend to be too irritating to acne-prone skin  2) Recommend Intense Pulse Light (photo facial) treatments   · Chemical peels recommended for improving skin texture    3) SPIRONOLACTONE 50 mg - take orally once daily     4) TOPICAL RETINOID:  At 1 hour before bedtime (after washing your face and allowing the skin to completely dry), spread only a single pea-sized amount of this medication evenly over your entire face (avoiding your eyes or mouth):   Tretinoin 0 025% micro cream one hour before bedtime      REMEMBER:  Always take your acne pills with lots of water! A pill stuck in your throat can cause significant burning and irritation  Drink a full glass of water to ensure the pill gets into your stomach  Avoid popping a pill right before bed, and stay upright for at least 1 hour after taking a pill  ACNE:  WHAT ZIT ALL ABOUT? WHY DO I HAVE ACNE/PIMPLES? Your skin is made of layers  To keep the skin from becoming dry and cracked, the skin needs oil  The oil is made in little wells in the deeper layers in the skin  People with acne have glands that make more oil and are more easily plugged, causing the glands to swell  Hormones, bacteria and your inherited tendency to have acne all play a role  The medical term for pimples is acne or acne vulgaris (vulgaris means common)  Most people get some acne  Acne does not come from being dirty    Instead, it is an expected consequence of changes that occur during normal growth and development  Hormones, bacteria, and your family's tendency to have acne may all play a role  Whiteheads or blackheads are openings of the glands (glands are the oil factories) onto the surface of the skin  Blackheads are not caused by dirt blocking the pores; instead, they result from the oxidation reaction of oil and skin in the pores with the air (like a rust reaction)  WHAT ABOUT STRESS? Stress does not cause acne but it can make it worse  Make sure you get enough sleep and daily exercise! WHAT ABOUT FOODS/DIET? Try to eat a balanced, healthy diet  Some people feel that certain foods worsen their acne  While there aren't many studies available on this question, severe dietary changes are unlikely to help your acne and may be harmful to the health of your skin  If you find that a certain food seems to aggravate your acne, you may consider avoiding that food  Discuss this with your physician! WHAT CAUSES MY ACNE? There are four contributors to acne--the body's natural oil (sebum), clogged pores, bacteria (with the scientific name Propionibacterium acnes, or P  acnes, for short), and the body's reaction to the bacteria living in the clogged pores (which causes inflammation)  Here's what happens:     Sebum is produced in the normal oil-making glands in the deeper layers of the skin and reaches the surface through the skin's pores  An increase in certain hormones occurs around the time of puberty, and these hormones trigger the oil glands to produce increased amounts of sebum   Pores with excess oil tend to become clogged more easily   At the same time, P  acnes--one of the many types of bacteria that normally live on everyone's skin--thrives in the excess oil and causes a skin reaction (inflammation)   If a pore is clogged close to the surface, there is little inflammation   However, this results in the formation of whiteheads (closed comedones) or blackheads (open comedones) at the surface of the skin   A plug that extends to, or forms a little deeper in the pore, or one that enlarges or ruptures may cause more inflammation  The result is red bumps (papules) and pus-filled pimples (pustules)   If plugging happens in the deepest skin layer, the inflammation may be even more severe, resulting in the formation of nodules or cysts  When these types of acne heal, they may leave behind discolored areas or true scars  SKIN HYGIENE:  HOW SHOULD I [facility] MY SKIN? Acne does not come from being dirty, however, washing your face is part of taking good care of your skin and will help keep your face clear  Good skin hygiene is, therefore, critical to support any acne treatment plan  Here are several specific suggestions for practicing good skin hygiene and keeping your skin looking its best:     You should wash acne-prone skin TWICE A DAY: Once in the morning and once in the evening  This does include any showers you take that day, so do not overdo it!  Do not scrub the skin with a washcloth or loofah as these can irritate and inflame your acne  Acne does not come from dirt, so it is not necessary to scrub the skin clean  In fact, scrubbing may lead to dryness and irritation that makes the acne even worse and harder for patients to tolerate acne medications   Use a gentle facial moisturizing cleanser (Cetaphil Moisturizing Cleanser or Dove Fragrance-Free bar)  Avoid using soaps like Sagar Romero, [address], [address] [facility], or soft/liquid soaps as these products will dry your skin   Do not use any over-the-counter acne washes without your doctor's specific instruction to do so  These products often contain salicylic acid or benzoyl peroxide  These ingredients can be helpful in clearing oil from the skin and reducing bacteria, but they may also be drying and can add to irritation   Do not use exfoliating products with microbeads or brushes as these can cause irritation to the skin   Facials and other treatments to remove, squeeze, or clean out pores are not recommended  Manipulating the skin in this way can make acne worse and can lead to severe infections and/or scarring  It also increases the likelihood that the skin will not be able to tolerate acne medications   Try not to pop pimples or pick at your acne as this can delay healing and may result in scarring or skin color changes (dark spots) that are often more noticeable than the acne itself  Picking/popping acne can also cause a serious skin infection   Wash or change your pillow case once to twice a week, especially if you use products in your hair   Wash the skin as soon as possible after playing sports or other activities that cause a lot of sweating  Also, pay attention to how your sports equipment (shoulder pads, helmet strap, etc ) might be making your acne worse   When you use makeup, moisturizer, or sunscreen make sure that these products are labeled non-comedogenic, or won't clog pores, or won't cause acne         SHOULD I TREAT MY ACNE? There are a number of other skin conditions that can look like acne  If there is any question about the diagnosis, then the person should be evaluated by a board certified pediatric and adolescent dermatologist   A physician should examine any child with acne who is between the ages of 3and 9years of age, as acne in this mid-childhood age group is not normal and may signal an underlying problem  If a preadolescent (9to 6years of age) or adolescent (15to 25years of age) has mild acne and the condition is not bothersome to the individual, proper and regular skin care (what your doctor may call skin hygiene) may be all that is needed at this point  Many people do, however, need specific acne medications to help their skin look and feel its best  Your doctor will tell you if you are one of these people   If so, you may be advised to use an over-the-counter or prescription medication that is applied to the skin (a topical medication) or if the addition of an oral medication (a medication taken by [employer]) is needed  The good news is that the medications work well when used properly! Some specific factors that may influence the choice of acne therapy include:     Severity  The number and type of skin lesions (papules or comedones) and the degree of inflammation (mild, moderate or severe)   Scarring  Scarring is most common when acne is severe, but it can happen even in children with mild acne   Impact  If a child is experiencing emotional complications because of the acne or is experiencing negative comments from other children   Cost of the acne medications  An acne expert can help to keep out of pocket costs to a minimum by utilizing the correct medications and the least expensive options   The patient's skin type (oily versus dry or combination skin, for example)   Potential side effects of the medication   The ease or overall complexity of the treatment plan or medication  WHAT ACNE TREATMENTS ARE AVAILABLE? Medications for acne try to stop the formation of new pimples by reducing or removing the oil, bacteria, and other things (like dead skin cells) that clog the pores  They can also decrease the inflammation or irritation response of the skin to bacteria  It may take from 6 to 8 weeks (about 2 months!) before you see any improvement and know if the medication is effective  It takes the layers of skin this long to regenerate  Remember, these medications do not cure the condition--the acne improves because of the medication  Therefore, treatment must be continued in order to prevent the return of acne lesions  There are many types of acne treatments  Some are applied to the skin (topical medications) and some are taken by mouth (oral medications)  In most cases of mild acne, the doctor will start with a topical medication  There are many different topical medications that are helpful for acne  If acne is more severe and it does not respond adequately to a topical medication, or if it covers large body surface areas such as the back and/or chest, oral antibiotics such as Doxycycline or Minocycline and/or oral hormone therapy such as Oral Contraceptive Pills or Spironolactone may be prescribed  In the most severe cases, isotretinoin (Accutane) may be used  In general, it is usually best to start with acne medications that are least likely to cause side effects but are at the same time capable of addressing the specific causes for the acne  Some patients have a good result with just one medication, but many will need to use a combination of treatments: two or more different topical agents or an oral medication plus a topical medication  Another treatment used for acne may include corticosteroid injections, which are used to help relieve pain, decrease the size, and encourage the healing of large, inflamed acne nodules  Also, dermatologists sometimes perform acne surgery, using a fine needle, a pointed blade, or an instrument known as a comedone extractor to mechanically clean out clogged pores  One must always weigh the risk for inducing a scar with the potential benefits of any procedure  Prior treatment with topical retinoids can loosen whiteheads and blackheads and make it easier to physically remove such lesions  Heat-based devices, and light and laser therapy are being studied to see whether there is any role for such treatments in mild to moderate acne  At this time, there is not enough evidence to make general recommendations about their use  TOPICAL ACNE MEDICATIONS    WHAT KIND OF TOPICALS ARE THERE?  Benzoyl peroxide (BP) helps to fight inflammation and is anti-microbial (kills bacteria, viruses, and other microorganisms) and is believed to help prevent resistance of bacteria to topical antibiotics   A benzoyl peroxide wash may be recommended for use on large areas such as the chest and/or back  Mild irritation and dryness are common when first using benzoyl peroxide-containing products  Be careful because benzoyl peroxide can bleach towels and clothing!  Retinoids (such as adapalene, tretinoin, or tazarotene) unplug the oil glands by helping peel away the layers of skin and other things plugging the opening of the glands  Mild irritation and dryness are common when first using these products  Facial waxing and other skin procedures can lead to excessive irritation and should be avoided during retinoid therapy   Antibiotics fight bacteria and help decrease inflammation  Topical antibiotics commonly used in acne include clindamycin, erythromycin, and combination agents (such as clindamycin/benzoyl peroxide or erythromycin/benzoyl peroxide)  Mild irritation and dryness are common when first using these products  Typically, topical antibiotics should not be used alone as treatment for acne   Other topical agents include salicylic acid, azelaic acid, dapsone, and sulfacetamide  Mild irritation and dryness can also occur when first using these products  USING YOUR TOPICAL TREATMENTS LIKE A PRO   Apply topical medications only to clean, dry skin  Topical medications may lead to significant dryness of the affected areas  To minimize this, wait 15-20 minutes after washing before applying your topical medication   These medications work deep in the skin to prevent new breakouts  Spot treatment of individual pimples does not do much  When applying topical medications to the face, use the 5-dot method  Start by placing a small pea-sized amount of the medication on your finger  Then, place dots in each of five locations of your face: Mid-forehead, each cheek, nose, and chin  Next, rub the medication into the entire area of skin - not just on individual pimples!  Try to avoid the delicate skin around your eyes and corners of your mouth   The medications are not magic! They take weeks if not months to work  Be patient and use your medicine on a daily basis or as directed for six weeks before asking if your skin looks better  Try not to miss more than one or two days each week when using your medications   If you are starting a new medication, then try using it every other night or even every third night   Gradually work up to [employer] a day    This will give your skin time to adjust    The same medications often come in various forms or formulations: Creams, ointments, lotions, gels, microspheres, or foams  Use the formulation that has been recommended and don't switch to other forms unless instructed  Some forms (such as alcohol based gels) may be more drying and less tolerable for certain skin types   Sometimes individual medications are not as effective as a combination of two or more agents  The doctor may need to try several medications or combinations before finding the one that is best for that patient   Moisturizer, sunscreen, and make-up may be used in conjunction with topical acne medications  In general, acne medications are applied first so they may directly contact the skin  Ask your physician to review specific application instructions!  It is especially important to always use sunscreen when using a topical retinoid or oral antibiotic  These drugs can make your skin more sensitive to the sun  In general, sunscreen gets applied AFTER any acne medications   Don't stop using your acne medications just because your acne got better  Remember, the acne is better because of the medication, and prevention is the key to treatment  HAVING PROBLEMS WITH ANY OF YOUR TREATMENTS? You should not be able to see any of the medicines on your face   If you can see a white film on your skin after you apply the medication, there is too much medicine in that area and you need to apply a thinner coat and make sure it is spread evenly on your face  If your skin gets too dry, you can apply a light (non-comedogenic) moisturizer on top of your medicine or you may switch to using the medicine every other day instead of every day  If your skin is still too irritated, you may need to switch to a milder medication  If your skin is red and very itchy, you may be allergic to the medication and you should stop using it  COMMON POSSIBLE SIDE EFFECTS OF MEDICATIONS     Retinoids - dryness, redness, increased sun sensitivity  WHEN AND WHERE TO CALL WITH CONCERNS  We are here to help! If you experience any unusual symptoms, then stop taking or using the medication and call our office at [phone] (SKIN)  It is better to be safe than to be sorry!     Scribe Attestation    I,:  Shona Heimlich am acting as a scribe while in the presence of the attending physician :       I,:  Hector Ramirez MD personally performed the services described in this documentation    as scribed in my presence :

## 2022-01-11 NOTE — TELEPHONE ENCOUNTER
Called [facility] to set up a peer to peer review  Per Karen Alvarado, patient was denied because they did not try 3 different birth control pills  Pt failed 1 ocp and 2 IUD's  Per patients prescription benefits, they need to fail 3 ocp's  Per Karen Alvarado, we can send a letter of medical necessity to [phone], we cannot set up a peer to peer review at this time  Letter of medical necessity faxed to [facility]     Patient aware if this letter is also denied, there is nothing else we can do to get phexxi covered, will need to pay out of pocket

## 2022-01-11 NOTE — PATIENT INSTRUCTIONS
ACNE VULGARIS ("COMMON ACNE") with ROSACEA    Assessment and Plan:   We reviewed the causes of acne, the kinds of acne, and the expected clinical course   We discussed treatment options ranging from over-the-counter products, topical retinoids, antibiotics, BP, hormonal therapies (OCPs/spironolactone), and isotretinoin (Accutane)   We reviewed specific over-the-counter interventions and medications  Recommended typical hygiene measures including water-based facial products, washing regularly with mild cleanser, and refraining from picking and popping any pimples   Recommended non-comedogenic sunscreen use daily   Expectations of therapy discussed  Side effects, risks and benefits of medications discussed   A comprehensive handout on Acne was provided   The phone number to call in case of questions or concerns (and instructions to stop medications in such a scenario) was provided   After lengthy discussion of etiology and treatment options, we decided to implement the following personalized treatment plan:    Based on a thorough discussion of this condition and the management approach to it (including a comprehensive discussion of the known risks, side effects and potential benefits of treatment), the patient (family) agrees to implement the following specific plan:    --------------------------------------------------------------------------------------  YOUR PERSONALIZED ACNE ACTION PLAN    [facility]    1) SKIN HYGIENE:  In the shower, wash your face, chest and back gently with Cetaphil moisturizing cleanser or Dove Fragrance-free bar  Do not use a luffa or washcloth as these tend to be too irritating to acne-prone skin  2) Soolantra 1% cream - apply topically once daily to face      EVENING ROUTINE    1) SKIN HYGIENE:  In the shower, wash your face, chest and back gently with Cetaphil moisturizing cleanser or Dove Fragrance-free bar    Do not use a lufa or washcloth as these tend to be too irritating to acne-prone skin  2) Recommend Intense Pulse Light (photo facial) treatments   · Chemical peels recommended for improving skin texture    3) SPIRONOLACTONE 50 mg - take orally once daily     4) TOPICAL RETINOID:  At 1 hour before bedtime (after washing your face and allowing the skin to completely dry), spread only a single pea-sized amount of this medication evenly over your entire face (avoiding your eyes or mouth):   Tretinoin 0 025% micro cream one hour before bedtime      REMEMBER:  Always take your acne pills with lots of water! A pill stuck in your throat can cause significant burning and irritation  Drink a full glass of water to ensure the pill gets into your stomach  Avoid popping a pill right before bed, and stay upright for at least 1 hour after taking a pill  ACNE:  WHAT ZIT ALL ABOUT? WHY DO I HAVE ACNE/PIMPLES? Your skin is made of layers  To keep the skin from becoming dry and cracked, the skin needs oil  The oil is made in little wells in the deeper layers in the skin  People with acne have glands that make more oil and are more easily plugged, causing the glands to swell  Hormones, bacteria and your inherited tendency to have acne all play a role  The medical term for pimples is acne or acne vulgaris (vulgaris means common)  Most people get some acne  Acne does not come from being dirty  Instead, it is an expected consequence of changes that occur during normal growth and development  Hormones, bacteria, and your family's tendency to have acne may all play a role  Whiteheads or blackheads are openings of the glands (glands are the oil factories) onto the surface of the skin  Blackheads are not caused by dirt blocking the pores; instead, they result from the oxidation reaction of oil and skin in the pores with the air (like a rust reaction)  WHAT ABOUT STRESS? Stress does not cause acne but it can make it worse   Make sure you get enough sleep and daily exercise! WHAT ABOUT FOODS/DIET? Try to eat a balanced, healthy diet  Some people feel that certain foods worsen their acne  While there aren't many studies available on this question, severe dietary changes are unlikely to help your acne and may be harmful to the health of your skin  If you find that a certain food seems to aggravate your acne, you may consider avoiding that food  Discuss this with your physician! WHAT CAUSES MY ACNE? There are four contributors to acne--the body's natural oil (sebum), clogged pores, bacteria (with the scientific name Propionibacterium acnes, or P  acnes, for short), and the body's reaction to the bacteria living in the clogged pores (which causes inflammation)  Here's what happens:     Sebum is produced in the normal oil-making glands in the deeper layers of the skin and reaches the surface through the skin's pores  An increase in certain hormones occurs around the time of puberty, and these hormones trigger the oil glands to produce increased amounts of sebum   Pores with excess oil tend to become clogged more easily   At the same time, P  acnes--one of the many types of bacteria that normally live on everyone's skin--thrives in the excess oil and causes a skin reaction (inflammation)   If a pore is clogged close to the surface, there is little inflammation  However, this results in the formation of whiteheads (closed comedones) or blackheads (open comedones) at the surface of the skin   A plug that extends to, or forms a little deeper in the pore, or one that enlarges or ruptures may cause more inflammation  The result is red bumps (papules) and pus-filled pimples (pustules)   If plugging happens in the deepest skin layer, the inflammation may be even more severe, resulting in the formation of nodules or cysts  When these types of acne heal, they may leave behind discolored areas or true scars      SKIN HYGIENE:  HOW SHOULD I [address] SKIN?  Acne does not come from being dirty, however, washing your face is part of taking good care of your skin and will help keep your face clear  Good skin hygiene is, therefore, critical to support any acne treatment plan  Here are several specific suggestions for practicing good skin hygiene and keeping your skin looking its best:     You should wash acne-prone skin TWICE A DAY: Once in the morning and once in the evening  This does include any showers you take that day, so do not overdo it!  Do not scrub the skin with a washcloth or loofah as these can irritate and inflame your acne  Acne does not come from dirt, so it is not necessary to scrub the skin clean  In fact, scrubbing may lead to dryness and irritation that makes the acne even worse and harder for patients to tolerate acne medications   Use a gentle facial moisturizing cleanser (Cetaphil Moisturizing Cleanser or Dove Fragrance-Free bar)  Avoid using soaps like Brunsolomon Ramirezalam, [address], [address], or soft/liquid soaps as these products will dry your skin   Do not use any over-the-counter acne washes without your doctor's specific instruction to do so  These products often contain salicylic acid or benzoyl peroxide  These ingredients can be helpful in clearing oil from the skin and reducing bacteria, but they may also be drying and can add to irritation   Do not use exfoliating products with microbeads or brushes as these can cause irritation to the skin   Facials and other treatments to remove, squeeze, or clean out pores are not recommended  Manipulating the skin in this way can make acne worse and can lead to severe infections and/or scarring  It also increases the likelihood that the skin will not be able to tolerate acne medications   Try not to pop pimples or pick at your acne as this can delay healing and may result in scarring or skin color changes (dark spots) that are often more noticeable than the acne itself  Picking/popping acne can also cause a serious skin infection   Wash or change your pillow case once to twice a week, especially if you use products in your hair   Wash the skin as soon as possible after playing sports or other activities that cause a lot of sweating  Also, pay attention to how your sports equipment (shoulder pads, helmet strap, etc ) might be making your acne worse   When you use makeup, moisturizer, or sunscreen make sure that these products are labeled non-comedogenic, or won't clog pores, or won't cause acne         SHOULD I TREAT MY ACNE? There are a number of other skin conditions that can look like acne  If there is any question about the diagnosis, then the person should be evaluated by a board certified pediatric and adolescent dermatologist   A physician should examine any child with acne who is between the ages of 3and 9years of age, as acne in this mid-childhood age group is not normal and may signal an underlying problem  If a preadolescent (9to 6years of age) or adolescent (15to 25years of age) has mild acne and the condition is not bothersome to the individual, proper and regular skin care (what your doctor may call skin hygiene) may be all that is needed at this point  Many people do, however, need specific acne medications to help their skin look and feel its best  Your doctor will tell you if you are one of these people  If so, you may be advised to use an over-the-counter or prescription medication that is applied to the skin (a topical medication) or if the addition of an oral medication (a medication taken by [employer]) is needed  The good news is that the medications work well when used properly! Some specific factors that may influence the choice of acne therapy include:     Severity  The number and type of skin lesions (papules or comedones) and the degree of inflammation (mild, moderate or severe)   Scarring   Scarring is most common when acne is severe, but it can happen even in children with mild acne   Impact  If a child is experiencing emotional complications because of the acne or is experiencing negative comments from other children   Cost of the acne medications  An acne expert can help to keep out of pocket costs to a minimum by utilizing the correct medications and the least expensive options   The patient's skin type (oily versus dry or combination skin, for example)   Potential side effects of the medication   The ease or overall complexity of the treatment plan or medication  WHAT ACNE TREATMENTS ARE AVAILABLE? Medications for acne try to stop the formation of new pimples by reducing or removing the oil, bacteria, and other things (like dead skin cells) that clog the pores  They can also decrease the inflammation or irritation response of the skin to bacteria  It may take from 6 to 8 weeks (about 2 months!) before you see any improvement and know if the medication is effective  It takes the layers of skin this long to regenerate  Remember, these medications do not cure the condition--the acne improves because of the medication  Therefore, treatment must be continued in order to prevent the return of acne lesions  There are many types of acne treatments  Some are applied to the skin (topical medications) and some are taken by mouth (oral medications)  In most cases of mild acne, the doctor will start with a topical medication  There are many different topical medications that are helpful for acne  If acne is more severe and it does not respond adequately to a topical medication, or if it covers large body surface areas such as the back and/or chest, oral antibiotics such as Doxycycline or Minocycline and/or oral hormone therapy such as Oral Contraceptive Pills or Spironolactone may be prescribed  In the most severe cases, isotretinoin (Accutane) may be used       In general, it is usually best to start with acne medications that are least likely to cause side effects but are at the same time capable of addressing the specific causes for the acne  Some patients have a good result with just one medication, but many will need to use a combination of treatments: two or more different topical agents or an oral medication plus a topical medication  Another treatment used for acne may include corticosteroid injections, which are used to help relieve pain, decrease the size, and encourage the healing of large, inflamed acne nodules  Also, dermatologists sometimes perform acne surgery, using a fine needle, a pointed blade, or an instrument known as a comedone extractor to mechanically clean out clogged pores  One must always weigh the risk for inducing a scar with the potential benefits of any procedure  Prior treatment with topical retinoids can loosen whiteheads and blackheads and make it easier to physically remove such lesions  Heat-based devices, and light and laser therapy are being studied to see whether there is any role for such treatments in mild to moderate acne  At this time, there is not enough evidence to make general recommendations about their use  TOPICAL ACNE MEDICATIONS    WHAT KIND OF TOPICALS ARE THERE?  Benzoyl peroxide (BP) helps to fight inflammation and is anti-microbial (kills bacteria, viruses, and other microorganisms) and is believed to help prevent resistance of bacteria to topical antibiotics  A benzoyl peroxide wash may be recommended for use on large areas such as the chest and/or back  Mild irritation and dryness are common when first using benzoyl peroxide-containing products  Be careful because benzoyl peroxide can bleach towels and clothing!  Retinoids (such as adapalene, tretinoin, or tazarotene) unplug the oil glands by helping peel away the layers of skin and other things plugging the opening of the glands   Mild irritation and dryness are common when first using these products  Facial waxing and other skin procedures can lead to excessive irritation and should be avoided during retinoid therapy   Antibiotics fight bacteria and help decrease inflammation  Topical antibiotics commonly used in acne include clindamycin, erythromycin, and combination agents (such as clindamycin/benzoyl peroxide or erythromycin/benzoyl peroxide)  Mild irritation and dryness are common when first using these products  Typically, topical antibiotics should not be used alone as treatment for acne   Other topical agents include salicylic acid, azelaic acid, dapsone, and sulfacetamide  Mild irritation and dryness can also occur when first using these products  USING YOUR TOPICAL TREATMENTS LIKE A PRO   Apply topical medications only to clean, dry skin  Topical medications may lead to significant dryness of the affected areas  To minimize this, wait 15-20 minutes after washing before applying your topical medication   These medications work deep in the skin to prevent new breakouts  Spot treatment of individual pimples does not do much  When applying topical medications to the face, use the 5-dot method  Start by placing a small pea-sized amount of the medication on your finger  Then, place dots in each of five locations of your face: Mid-forehead, each cheek, nose, and chin  Next, rub the medication into the entire area of skin - not just on individual pimples! Try to avoid the delicate skin around your eyes and corners of your mouth   The medications are not magic! They take weeks if not months to work  Be patient and use your medicine on a daily basis or as directed for six weeks before asking if your skin looks better  Try not to miss more than one or two days each week when using your medications   If you are starting a new medication, then try using it every other night or even every third night   Gradually work up to [employer] a day    This will give your skin time to adjust    The same medications often come in various forms or formulations: Creams, ointments, lotions, gels, microspheres, or foams  Use the formulation that has been recommended and don't switch to other forms unless instructed  Some forms (such as alcohol based gels) may be more drying and less tolerable for certain skin types   Sometimes individual medications are not as effective as a combination of two or more agents  The doctor may need to try several medications or combinations before finding the one that is best for that patient   Moisturizer, sunscreen, and make-up may be used in conjunction with topical acne medications  In general, acne medications are applied first so they may directly contact the skin  Ask your physician to review specific application instructions!  It is especially important to always use sunscreen when using a topical retinoid or oral antibiotic  These drugs can make your skin more sensitive to the sun  In general, sunscreen gets applied AFTER any acne medications   Don't stop using your acne medications just because your acne got better  Remember, the acne is better because of the medication, and prevention is the key to treatment  HAVING PROBLEMS WITH ANY OF YOUR TREATMENTS? You should not be able to see any of the medicines on your face  If you can see a white film on your skin after you apply the medication, there is too much medicine in that area and you need to apply a thinner coat and make sure it is spread evenly on your face  If your skin gets too dry, you can apply a light (non-comedogenic) moisturizer on top of your medicine or you may switch to using the medicine every other day instead of every day  If your skin is still too irritated, you may need to switch to a milder medication  If your skin is red and very itchy, you may be allergic to the medication and you should stop using it        COMMON POSSIBLE SIDE EFFECTS OF MEDICATIONS     Retinoids - dryness, redness, increased sun sensitivity  WHEN AND WHERE TO CALL WITH CONCERNS  We are here to help! If you experience any unusual symptoms, then stop taking or using the medication and call our office at [phone] (SKIN)  It is better to be safe than to be sorry!

## 2022-01-13 NOTE — NURSING NOTE
Call placed to patient to discuss upcoming appointment at [place] [address] radiology department and complete consultation with patient  Patient is having left knee evaluation and aspiration utilizing US  guidance  Reviewed patient's allergies, no current anticoagulant medication present, also discussed the pre and post procedure expectations  Reminded patient of location and time expected for procedure, Patient expressed understanding by verbalizing and repeating instructions

## 2022-01-20 ENCOUNTER — HOSPITAL ENCOUNTER (OUTPATIENT)
Dept: RADIOLOGY | Facility: HOSPITAL | Age: 37
Discharge: HOME | End: 2022-01-20
Admitting: RADIOLOGY
Payer: COMMERCIAL

## 2022-01-20 DIAGNOSIS — M25.862 KNEE JOINT CYST, LEFT: ICD-10-CM

## 2022-01-20 PROCEDURE — 20611 DRAIN/INJ JOINT/BURSA W/US: CPT

## 2022-01-20 RX ORDER — LIDOCAINE HYDROCHLORIDE 10 MG/ML
3 INJECTION, SOLUTION EPIDURAL; INFILTRATION; INTRACAUDAL; PERINEURAL ONCE
Status: COMPLETED | OUTPATIENT
Start: 2022-01-20 | End: 2022-01-20

## 2022-01-20 RX ADMIN — LIDOCAINE HYDROCHLORIDE 3 ML: 10 INJECTION, SOLUTION EPIDURAL; INFILTRATION; INTRACAUDAL; PERINEURAL at 08:30

## 2022-01-24 ENCOUNTER — NURSE TRIAGE (OUTPATIENT)
Dept: OTHER | Facility: OTHER | Age: 37
End: 2022-01-24

## 2022-01-24 ENCOUNTER — OFFICE VISIT (OUTPATIENT)
Dept: INTERNAL MEDICINE CLINIC | Facility: CLINIC | Age: 37
End: 2022-01-24
Payer: COMMERCIAL

## 2022-01-24 VITALS
HEIGHT: 62 IN | OXYGEN SATURATION: 97 % | TEMPERATURE: 98 F | BODY MASS INDEX: 24.84 KG/M2 | WEIGHT: 135 LBS | RESPIRATION RATE: 16 BRPM | DIASTOLIC BLOOD PRESSURE: 68 MMHG | HEART RATE: 77 BPM | SYSTOLIC BLOOD PRESSURE: 110 MMHG

## 2022-01-24 DIAGNOSIS — R07.9 CHEST PAIN, UNSPECIFIED TYPE: Primary | ICD-10-CM

## 2022-01-24 PROCEDURE — 99214 OFFICE O/P EST MOD 30 MIN: CPT | Performed by: INTERNAL MEDICINE

## 2022-01-24 PROCEDURE — 93000 ELECTROCARDIOGRAM COMPLETE: CPT | Performed by: INTERNAL MEDICINE

## 2022-01-24 PROCEDURE — 3008F BODY MASS INDEX DOCD: CPT | Performed by: INTERNAL MEDICINE

## 2022-01-24 PROCEDURE — 1036F TOBACCO NON-USER: CPT | Performed by: INTERNAL MEDICINE

## 2022-01-24 NOTE — PROGRESS NOTES
Assessment/Plan:  Suspect muscular chest pain  Continue to observe  Continue regular exercise  Call if the pain persists       Problem List Items Addressed This Visit     None      Visit Diagnoses     Chest pain, unspecified type    -  Primary    Relevant Orders    POCT ECG (Completed)            Subjective:      Patient ID: Franky Irby is a 31 y o  adult  HPI  Here with her mother  She experienced left upper chest pain 5 days ago while running  3-4/10 mild discomfort not radiation with no other symptoms  She slowed down but was able to finish her run  Symptoms resolved  Similar pain yesterday while swimming  She slowed down and was able to finish her swim  Again, pain resolved on its own   Denies pain at rest or with other exertion  No chest wall tenderness  Denies recent illness, fever, chills    The following portions of the patient's history were reviewed and updated as appropriate: allergies, current medications, past family history, past medical history, past social history, past surgical history and problem list     Review of Systems   Constitutional: Negative for chills and fever  Respiratory: Negative for cough and shortness of breath  Cardiovascular: Positive for chest pain  Gastrointestinal: Negative for abdominal pain  Neurological: Negative for dizziness  Objective:      /68   Pulse 77   Temp 98 °F (36 7 °C)   Resp 16   Ht 5' 2" (1 575 m)   Wt 61 2 kg (135 lb)   SpO2 97%   BMI 24 69 kg/m²          Physical Exam  Constitutional:       General: Franky Irby is not in acute distress  Appearance: Franky Irby is well-developed  Franky Irby is not ill-appearing, toxic-appearing or diaphoretic  HENT:      Head: Normocephalic and atraumatic  Right Ear: External ear normal       Left Ear: External ear normal    Eyes:      Conjunctiva/sclera: Conjunctivae normal    Cardiovascular:      Rate and Rhythm: Normal rate and regular rhythm        Heart sounds: Normal heart sounds  No murmur heard  Pulmonary:      Effort: Pulmonary effort is normal  No respiratory distress  Breath sounds: Normal breath sounds  No wheezing or rales  Chest:      Chest wall: No tenderness  Abdominal:      General: There is no distension  Palpations: Abdomen is soft  There is no mass  Tenderness: There is no abdominal tenderness  There is no guarding or rebound  Musculoskeletal:         General: Normal range of motion  Cervical back: Neck supple  Skin:     General: Skin is warm and dry  Neurological:      Mental Status: Lucy Kendall is alert and oriented to person, place, and time  Psychiatric:         Behavior: Behavior normal          Thought Content:  Thought content normal          Judgment: Judgment normal

## 2022-01-24 NOTE — TELEPHONE ENCOUNTER
Patient called in reporting chest pain while running last week and again yesterday  States it lasts for about 5-10 minutes  Denies other symptoms  Patient was scheduled for appointment today per protocol  Reason for Disposition   All other patients with chest pain (Exception: fleeting chest pain lasting a few seconds)    Answer Assessment - Initial Assessment Questions  1  LOCATION: "Where does it hurt?"        Left side of chest    2  RADIATION: "Does the pain go anywhere else?" (e g , into neck, jaw, arms, back)      denies    3  ONSET: "When did the chest pain begin?" (Minutes, hours or days)       Noticed it last week once during a run and yesterday during a run in the beginning     4  PATTERN "Does the pain come and go, or has it been constant since it started?"  "Does it get worse with exertion?"       Comes and goes, lasted about 5-10 minutes on the run     5  DURATION: "How long does it last" (e g , seconds, minutes, hours)      About 5-10 minutes     6  SEVERITY: "How bad is the pain?"  (e g , Scale 1-10; mild, moderate, or severe)     - MILD (1-3): doesn't interfere with normal activities      - MODERATE (4-7): interferes with normal activities or awakens from sleep     - SEVERE (8-10): excruciating pain, unable to do any normal activities        3-4/10     7  CARDIAC RISK FACTORS: "Do you have any history of heart problems or risk factors for heart disease?" (e g , angina, prior heart attack; diabetes, high blood pressure, high cholesterol, smoker, or strong family history of heart disease)      Denies     8  PULMONARY RISK FACTORS: "Do you have any history of lung disease?"  (e g , blood clots in lung, asthma, emphysema, birth control pills)      Denies     9  CAUSE: "What do you think is causing the chest pain?"      Unsure     10  OTHER SYMPTOMS: "Do you have any other symptoms?" (e g , dizziness, nausea, vomiting, sweating, fever, difficulty breathing, cough)        Denies     11   PREGNANCY: "Is there any chance you are pregnant?" "When was your last menstrual period?"        Denies    Protocols used: CHEST PAIN-ADULT-OH

## 2022-01-24 NOTE — TELEPHONE ENCOUNTER
Regarding: Chest pain/[facility]  ----- Message from Mariposa Morrissey sent at 1/24/2022  8:15 AM EST -----  "I was doing some cardio exercise, and I noticed some chest pain on my left side    It made me a little nervous, this was the second time it has happened "

## 2022-02-11 ENCOUNTER — TELEPHONE (OUTPATIENT)
Dept: DERMATOLOGY | Facility: CLINIC | Age: 37
End: 2022-02-11

## 2022-02-11 NOTE — TELEPHONE ENCOUNTER
Pt is calling to question about the IPL laser treatment  I scheduled her for June w/Dr Oviedo Last, as she is having issues with places giving her information about their vaccination status (and she lives with her older parents)  I did provide her with [address] [place] [address] to see if they provided said treatment (as I have been there from [facility] recommendation and owner wore mask, had good cleaning routine during pandemic, etc ), also provided pt with plastic surgery's contact info to question if they do that as they have an anaesthetist working there and was going to start doing some things w/lasers  Pt is calling to see if there is anyone else that you can recommend and/or provide her some assistance  Thank you!

## 2022-02-14 ENCOUNTER — TELEPHONE (OUTPATIENT)
Dept: DERMATOLOGY | Facility: CLINIC | Age: 37
End: 2022-02-14

## 2022-03-03 ENCOUNTER — OFFICE VISIT (OUTPATIENT)
Dept: INTERNAL MEDICINE CLINIC | Facility: CLINIC | Age: 37
End: 2022-03-03
Payer: COMMERCIAL

## 2022-03-03 ENCOUNTER — NURSE TRIAGE (OUTPATIENT)
Dept: OTHER | Facility: OTHER | Age: 37
End: 2022-03-03

## 2022-03-03 VITALS
SYSTOLIC BLOOD PRESSURE: 104 MMHG | WEIGHT: 134.8 LBS | HEART RATE: 80 BPM | BODY MASS INDEX: 24.8 KG/M2 | DIASTOLIC BLOOD PRESSURE: 62 MMHG | OXYGEN SATURATION: 99 % | HEIGHT: 62 IN

## 2022-03-03 DIAGNOSIS — N30.10 INTERSTITIAL CYSTITIS: Primary | ICD-10-CM

## 2022-03-03 LAB
SL AMB  POCT GLUCOSE, UA: NORMAL
SL AMB LEUKOCYTE ESTERASE,UA: NORMAL
SL AMB POCT BILIRUBIN,UA: NORMAL
SL AMB POCT BLOOD,UA: NORMAL
SL AMB POCT CLARITY,UA: CLEAR
SL AMB POCT COLOR,UA: YELLOW
SL AMB POCT KETONES,UA: NORMAL
SL AMB POCT NITRITE,UA: NORMAL
SL AMB POCT PH,UA: 6
SL AMB POCT SPECIFIC GRAVITY,UA: 1.01
SL AMB POCT URINE PROTEIN: NORMAL
SL AMB POCT UROBILINOGEN: 3.5

## 2022-03-03 PROCEDURE — 1036F TOBACCO NON-USER: CPT | Performed by: NURSE PRACTITIONER

## 2022-03-03 PROCEDURE — 81002 URINALYSIS NONAUTO W/O SCOPE: CPT | Performed by: NURSE PRACTITIONER

## 2022-03-03 PROCEDURE — 3008F BODY MASS INDEX DOCD: CPT | Performed by: NURSE PRACTITIONER

## 2022-03-03 PROCEDURE — 99213 OFFICE O/P EST LOW 20 MIN: CPT | Performed by: NURSE PRACTITIONER

## 2022-03-03 NOTE — PATIENT INSTRUCTIONS
Interstitial Cystitis   WHAT YOU NEED TO KNOW:   What is interstitial cystitis? Interstitial cystitis (IC) is also called painful bladder syndrome  IC is a condition that causes pain in your bladder and pelvic area  You may also have ulcers in your bladder  The cause of IC is not known  What are other symptoms of IC?   · Pressure in your bladder and pelvic area    · Urgent need to urinate    · Urinating more often or waking to urinate    · Increased pain during menstruation in women    · Pain in the penis or scrotum in men    · Pain during sex    How is IC diagnosed? Your healthcare provider will examine you and ask about your symptoms  He or she will also ask about any medical conditions you have and medicines you take  Your provider may ask you to keep track of how much liquid you drink  You may also need to keep track of how often and how much you urinate  Your provider may order tests to rule out other causes of IC  Examples include urine tests or a cystoscopy  A cystoscopy is a procedure to look inside your urethra and bladder  How is IC treated? The goal of treatment is to control your symptoms  Your healthcare provider may recommend any of the following:  · Nutrition changes  may be needed  Foods such as oranges, genevieve, tomatoes, chocolate, alcohol, spicy foods, soft drinks, and coffee may worsen your symptoms  · Medicines  may be given to decrease symptoms such as pain or the need to urinate urgently or often  These medicines may be taken by mouth or placed directly into your bladder  Antibiotics may be given for 1 to 5 days to treat a bacterial infection  · Bladder distension  is a procedure to stretch the walls of your bladder using gas or fluid  · Electrical stimulation  helps increase blood flow to your bladder and strengthens the muscles that control your bladder  Mild electrical pulses are sent to the nerves in your bladder   The impulses may also help release hormones that block pain     · Surgery  may be done to remove ulcers in your bladder or make your bladder larger  Damaged areas of your bladder may be removed and replaced with tissue from your large intestine  How can I manage my symptoms? · Do Kegel exercises as directed  Kegel exercises will help strengthen the muscles that control bowel movements and urination  Ask your healthcare provider for more information on Kegel exercises  Tighten your pelvic muscles slowly  It may feel like you are trying to hold back urine or gas  Hold these muscles and count to 3  Relax, tighten them quickly, and release  Repeat the cycle 10 times  Do 10 sets of Kegel exercises, 5 times a day  Do not hold your breath when you do Kegel exercises  Keep your stomach, back, and leg muscles relaxed  · Do not smoke  Smoking may worsen your symptoms  Nicotine and other chemicals in cigarettes and cigars can also cause lung damage  Ask your healthcare provider for information if you currently smoke and need help to quit  E-cigarettes or smokeless tobacco still contain nicotine  Talk to your healthcare provider before you use these products  · Train your bladder to urinate less often  This can be done by going to the bathroom at scheduled times  Try to hold your urine when you feel the urge to go  For example, hold your urine for 5 minutes when you feel the urge to go  As that becomes easier, hold your urine for 10 minutes  · Manage stress  Stress may worsen symptoms  Your healthcare provider may recommend you find ways to manage stress, such as relaxation techniques  When should I call my doctor? · Your symptoms get worse, or you develop new symptoms  · You have questions or concerns about your condition or care  Nutrition   Foods to eat:     Increase intake of complex carbohydrates such as brown bread, brown rice and sweet potatoes  Increase intake of fresh green leafy vegetables    Foods to avoid:    Acidic citrus fruits like oranges and genevieve  Food containing high concentrations of vitamin C such as bell peppers, dark leafy greens, kiwi fruits, broccoli and tomatoes  Chocolate  Caffeinated drinks like coffee and sodas  Carbonated drinks  Alcohol  Spicy foods  Artificial sweeteners    CARE AGREEMENT:   You have the right to help plan your care  Learn about your health condition and how it may be treated  Discuss treatment options with your healthcare providers to decide what care you want to receive  You always have the right to refuse treatment  The above information is an  only  It is not intended as medical advice for individual conditions or treatments  Talk to your doctor, nurse or pharmacist before following any medical regimen to see if it is safe and effective for you  © Copyright [employer] [facility] 2022 Information is for End User's use only and may not be sold, redistributed or otherwise used for commercial purposes   All illustrations and images included in CareNotes® are the copyrighted property of A D A M , Inc  or [address] [place] [address]

## 2022-03-03 NOTE — TELEPHONE ENCOUNTER
Regarding: UTI  ----- Message from [facility] sent at 3/3/2022  8:23 AM EST -----  "I have UTI symptoms and would like to get tested"  Started about 2 days ago

## 2022-03-03 NOTE — PROGRESS NOTES
Assessment/Plan:    Interstitial cystitis  Discussed pathophysiology and symptoms of interstitial cystitis  Discuss treatment options  Since her symptoms are mild try anti-inflammatories drink lots of fluids and go on a IC diet  Information provided in after visit summary       Diagnoses and all orders for this visit:    Interstitial cystitis          Subjective:      Patient ID: Meño Hightower is a 31 y o  adult  Patient is here for bladder discomfort and burning urgency of urine  There is no burning while urinating no odor no discharge  Her urine dip at the office today was negative for infection  Years ago she was diagnosed with interstitial cystitis in [place] she did see a urologist      The following portions of the patient's history were reviewed and updated as appropriate: allergies, current medications, past family history, past medical history, past social history, past surgical history and problem list     Review of Systems   Constitutional: Negative  HENT: Negative  Eyes: Negative  Respiratory: Negative  Cardiovascular: Negative  Gastrointestinal: Negative  Genitourinary: Positive for difficulty urinating, dysuria and urgency  Musculoskeletal: Negative  Neurological: Negative  Objective:      /62   Pulse 80   Ht 5' 2" (1 575 m)   Wt 61 1 kg (134 lb 12 8 oz)   SpO2 99%   BMI 24 66 kg/m²          Physical Exam  Vitals and nursing note reviewed  Constitutional:       Appearance: Meño Hightower is well-developed  HENT:      Head: Normocephalic and atraumatic  Right Ear: External ear normal       Left Ear: External ear normal       Nose: Nose normal    Eyes:      Conjunctiva/sclera: Conjunctivae normal       Pupils: Pupils are equal, round, and reactive to light  Cardiovascular:      Rate and Rhythm: Normal rate and regular rhythm  Pulmonary:      Effort: Pulmonary effort is normal       Breath sounds: Normal breath sounds     Musculoskeletal: General: Normal range of motion  Cervical back: Normal range of motion and neck supple  Skin:     General: Skin is warm and dry  Neurological:      Mental Status: Madiha Diaz is alert and oriented to person, place, and time

## 2022-03-03 NOTE — TELEPHONE ENCOUNTER
An appointment has been made for this patient today at 2:15pm       Reason for Disposition   Urinating more frequently than usual (i e , frequency)    Answer Assessment - Initial Assessment Questions  1  SYMPTOM: "What's the main symptom you're concerned about?" (e g , frequency, incontinence)      Discomfort, urgency and frequency    2  ONSET: "When did the  symptoms  start?"      2-3 days ago  3  PAIN: "Is there any pain?" If Yes, ask: "How bad is it?" (Scale: 1-10; mild, moderate, severe)      "Uncomfortable"    4  CAUSE: "What do you think is causing the symptoms?"      Possible UTI     5  OTHER SYMPTOMS: "Do you have any other symptoms?" (e g , fever, flank pain, blood in urine, pain with urination)      Denies     6   PREGNANCY: "Is there any chance you are pregnant?" "When was your last menstrual period?"      N/a    Protocols used: URINARY SYMPTOMS-ADULT-[place]

## 2022-03-03 NOTE — ASSESSMENT & PLAN NOTE
Discussed pathophysiology and symptoms of interstitial cystitis  Discuss treatment options  Since her symptoms are mild try anti-inflammatories drink lots of fluids and go on a IC diet    Information provided in after visit summary

## 2022-03-24 DIAGNOSIS — L70.0 ACNE VULGARIS: Primary | ICD-10-CM

## 2022-03-24 RX ORDER — SPIRONOLACTONE 50 MG/1
50 TABLET, FILM COATED ORAL DAILY
Qty: 90 TABLET | Refills: 3 | Status: SHIPPED | OUTPATIENT
Start: 2022-03-24 | End: 2022-04-25 | Stop reason: SDUPTHER

## 2022-04-05 ENCOUNTER — OFFICE VISIT (OUTPATIENT)
Dept: INTERNAL MEDICINE CLINIC | Facility: CLINIC | Age: 37
End: 2022-04-05
Payer: COMMERCIAL

## 2022-04-05 VITALS
BODY MASS INDEX: 24.07 KG/M2 | WEIGHT: 130.8 LBS | SYSTOLIC BLOOD PRESSURE: 108 MMHG | HEIGHT: 62 IN | OXYGEN SATURATION: 97 % | DIASTOLIC BLOOD PRESSURE: 68 MMHG | HEART RATE: 69 BPM | RESPIRATION RATE: 16 BRPM

## 2022-04-05 DIAGNOSIS — E03.9 ACQUIRED HYPOTHYROIDISM: ICD-10-CM

## 2022-04-05 DIAGNOSIS — K58.2 IRRITABLE BOWEL SYNDROME WITH BOTH CONSTIPATION AND DIARRHEA: Primary | ICD-10-CM

## 2022-04-05 PROCEDURE — 1036F TOBACCO NON-USER: CPT | Performed by: INTERNAL MEDICINE

## 2022-04-05 PROCEDURE — 3008F BODY MASS INDEX DOCD: CPT | Performed by: INTERNAL MEDICINE

## 2022-04-05 PROCEDURE — 99214 OFFICE O/P EST MOD 30 MIN: CPT | Performed by: INTERNAL MEDICINE

## 2022-04-05 RX ORDER — LACTIC ACID, L-, CITRIC ACID MONOHYDRATE, AND POTASSIUM BITARTRATE 90; 50; 20 MG/5G; MG/5G; MG/5G
GEL VAGINAL
COMMUNITY
Start: 2022-03-22

## 2022-04-05 NOTE — PROGRESS NOTES
Assessment/Plan:  Discussed low FODMAP diet, apps, web sites to look at  We discussed that GI has a nutritionist that can help her with this also  I would like her to get labs done aletha prior to seeing GI  Continue nortriptyline  Will check peppermint dosing      Problem List Items Addressed This Visit        Digestive    IBS (irritable bowel syndrome) - Primary    Relevant Orders    Ambulatory Referral to Gastroenterology    CBC and Platelet    Comprehensive metabolic panel    Celiac Disease Antibody Profile       Endocrine    Hypothyroid    Relevant Orders    CBC and Platelet    Comprehensive metabolic panel    TSH, 3rd generation with Free T4 reflex            Subjective:      Patient ID: Basilio Elizalde is a 31 y o  adult  HPI  Requesting GI referral for her IBS-diarrhea  Saw GI in [place] 5 years ago and underwent blood and stool tests that were normal   Colonoscopy in her early 25s was normal  That was performed because an uncle was dx with colon cancer at a young age  She has been on nortriptyline but seems to be losing effectiveness  She is feeling more constipated (bulkier stools) and has more diarrhea  Sometimes notices blood in her stool  She tried a low fodmap diet in the fall and symptoms improved  It was hard to reintroduce food back to fully comply with the diet     The following portions of the patient's history were reviewed and updated as appropriate: allergies, current medications, past family history, past medical history, past social history, past surgical history and problem list     Review of Systems   Constitutional: Negative for appetite change, chills, fever and unexpected weight change  Respiratory: Negative for shortness of breath  Cardiovascular: Negative for chest pain and palpitations  Gastrointestinal: Positive for abdominal pain, blood in stool, constipation and diarrhea  Negative for nausea and vomiting  Genitourinary: Negative for difficulty urinating and dysuria  Objective:      /68   Pulse 69   Resp 16   Ht 5' 2" (1 575 m)   Wt 59 3 kg (130 lb 12 8 oz)   SpO2 97%   BMI 23 92 kg/m²          Physical Exam  Constitutional:       General: Akilah Bueno is not in acute distress  Appearance: Akilah Bueno is well-developed  Akilah Bueno is not ill-appearing, toxic-appearing or diaphoretic  HENT:      Head: Normocephalic and atraumatic  Eyes:      Conjunctiva/sclera: Conjunctivae normal    Cardiovascular:      Rate and Rhythm: Normal rate and regular rhythm  Heart sounds: Normal heart sounds  No murmur heard  Pulmonary:      Effort: Pulmonary effort is normal  No respiratory distress  Breath sounds: Normal breath sounds  No wheezing or rales  Abdominal:      General: There is no distension  Palpations: Abdomen is soft  There is no mass  Tenderness: There is no abdominal tenderness  There is no guarding or rebound  Musculoskeletal:      Cervical back: Neck supple  Right lower leg: No edema  Left lower leg: No edema  Skin:     General: Skin is warm and dry  Neurological:      Mental Status: Akilah Bueno is alert and oriented to person, place, and time  Psychiatric:         Mood and Affect: Mood normal          Behavior: Behavior normal          Thought Content:  Thought content normal          Judgment: Judgment normal

## 2022-04-25 DIAGNOSIS — L70.0 ACNE VULGARIS: ICD-10-CM

## 2022-04-25 RX ORDER — SPIRONOLACTONE 50 MG/1
50 TABLET, FILM COATED ORAL 2 TIMES DAILY
Qty: 180 TABLET | Refills: 3 | Status: SHIPPED | OUTPATIENT
Start: 2022-04-25 | End: 2022-06-27 | Stop reason: SDUPTHER

## 2022-04-28 ENCOUNTER — APPOINTMENT (OUTPATIENT)
Dept: LAB | Facility: CLINIC | Age: 37
End: 2022-04-28
Payer: COMMERCIAL

## 2022-04-28 LAB
ALBUMIN SERPL BCP-MCNC: 4.1 G/DL (ref 3.5–5)
ALP SERPL-CCNC: 50 U/L (ref 46–116)
ALT SERPL W P-5'-P-CCNC: 19 U/L (ref 12–78)
ANION GAP SERPL CALCULATED.3IONS-SCNC: 3 MMOL/L (ref 4–13)
AST SERPL W P-5'-P-CCNC: 15 U/L (ref 5–45)
BILIRUB SERPL-MCNC: 0.45 MG/DL (ref 0.2–1)
BUN SERPL-MCNC: 13 MG/DL (ref 5–25)
CALCIUM SERPL-MCNC: 9.3 MG/DL (ref 8.3–10.1)
CHLORIDE SERPL-SCNC: 106 MMOL/L (ref 100–108)
CO2 SERPL-SCNC: 28 MMOL/L (ref 21–32)
CREAT SERPL-MCNC: 1.08 MG/DL (ref 0.6–1.3)
ERYTHROCYTE [DISTWIDTH] IN BLOOD BY AUTOMATED COUNT: 12.9 % (ref 11.6–15.1)
GLUCOSE SERPL-MCNC: 80 MG/DL (ref 65–140)
HCT VFR BLD AUTO: 44.9 % (ref 36.5–46.1)
HGB BLD-MCNC: 14.5 G/DL (ref 12–15.4)
MCH RBC QN AUTO: 30.2 PG (ref 26.8–34.3)
MCHC RBC AUTO-ENTMCNC: 32.3 G/DL (ref 31.4–37.4)
MCV RBC AUTO: 94 FL (ref 82–98)
PLATELET # BLD AUTO: 236 THOUSANDS/UL (ref 149–390)
PMV BLD AUTO: 11 FL (ref 8.9–12.7)
POTASSIUM SERPL-SCNC: 4.3 MMOL/L (ref 3.5–5.3)
PROT SERPL-MCNC: 7.5 G/DL (ref 6.4–8.2)
RBC # BLD AUTO: 4.8 MILLION/UL (ref 3.88–5.12)
SODIUM SERPL-SCNC: 137 MMOL/L (ref 136–145)
TSH SERPL DL<=0.05 MIU/L-ACNC: 1.57 UIU/ML (ref 0.45–4.5)
WBC # BLD AUTO: 7.91 THOUSAND/UL (ref 4.31–10.16)

## 2022-04-28 PROCEDURE — 36415 COLL VENOUS BLD VENIPUNCTURE: CPT | Performed by: INTERNAL MEDICINE

## 2022-04-28 PROCEDURE — 84443 ASSAY THYROID STIM HORMONE: CPT | Performed by: INTERNAL MEDICINE

## 2022-04-28 PROCEDURE — 86364 TISS TRNSGLTMNASE EA IG CLAS: CPT | Performed by: INTERNAL MEDICINE

## 2022-04-28 PROCEDURE — 80053 COMPREHEN METABOLIC PANEL: CPT | Performed by: INTERNAL MEDICINE

## 2022-04-28 PROCEDURE — 85027 COMPLETE CBC AUTOMATED: CPT | Performed by: INTERNAL MEDICINE

## 2022-04-28 PROCEDURE — 82784 ASSAY IGA/IGD/IGG/IGM EACH: CPT | Performed by: INTERNAL MEDICINE

## 2022-04-28 PROCEDURE — 86231 EMA EACH IG CLASS: CPT | Performed by: INTERNAL MEDICINE

## 2022-04-28 PROCEDURE — 86258 DGP ANTIBODY EACH IG CLASS: CPT | Performed by: INTERNAL MEDICINE

## 2022-04-30 LAB
ENDOMYSIUM IGA SER QL: NEGATIVE
GLIADIN PEPTIDE IGA SER-ACNC: 4 UNITS (ref 0–19)
GLIADIN PEPTIDE IGG SER-ACNC: 2 UNITS (ref 0–19)
IGA SERPL-MCNC: 177 MG/DL (ref 87–352)
TTG IGA SER-ACNC: <2 U/ML (ref 0–3)
TTG IGG SER-ACNC: <2 U/ML (ref 0–5)

## 2022-05-03 ENCOUNTER — OFFICE VISIT (OUTPATIENT)
Dept: GASTROENTEROLOGY | Facility: CLINIC | Age: 37
End: 2022-05-03
Payer: COMMERCIAL

## 2022-05-03 ENCOUNTER — TELEPHONE (OUTPATIENT)
Dept: OTHER | Facility: OTHER | Age: 37
End: 2022-05-03

## 2022-05-03 VITALS
TEMPERATURE: 98 F | HEART RATE: 64 BPM | OXYGEN SATURATION: 97 % | BODY MASS INDEX: 23.92 KG/M2 | WEIGHT: 130 LBS | HEIGHT: 62 IN | SYSTOLIC BLOOD PRESSURE: 102 MMHG | DIASTOLIC BLOOD PRESSURE: 78 MMHG

## 2022-05-03 DIAGNOSIS — K58.2 IRRITABLE BOWEL SYNDROME WITH BOTH CONSTIPATION AND DIARRHEA: ICD-10-CM

## 2022-05-03 PROCEDURE — 99204 OFFICE O/P NEW MOD 45 MIN: CPT | Performed by: INTERNAL MEDICINE

## 2022-05-03 PROCEDURE — 1036F TOBACCO NON-USER: CPT | Performed by: INTERNAL MEDICINE

## 2022-05-03 PROCEDURE — 3008F BODY MASS INDEX DOCD: CPT | Performed by: INTERNAL MEDICINE

## 2022-05-03 RX ORDER — MELATONIN 2.5 MG
TABLET,CHEWABLE ORAL
COMMUNITY

## 2022-05-03 NOTE — LETTER
May 3, 2022     Ophelia Soto, [address] [facility] [address]  [address] [address]    Patient: Brittni Roberts   YOB: 1985   Date of Visit: 5/3/2022       Dear Dr Destiny Cardenas: Thank you for referring Nandini Munoz to me for evaluation  Below are my notes for this consultation  If you have questions, please do not hesitate to call me  I look forward to following your patient along with you  Sincerely,        Uziel Tinsley MD        CC: No Recipients  Uziel Tinsley MD  5/3/2022 12:59 PM  Sign when Signing Visit  Consultation - [address] [facility]  Brittni Northbrook 31 y o  adult MRN: [id number]  Unit/Bed#:  Encounter: [id number]        Consults    ASSESSMENT/PLAN:     1  Cramping abdominal pain associated diarrhea along with constipation with more predominant symptoms of loose stools-has established diagnosis of IBS for many years however has had acute worsening of symptoms for the past 2-3 months  Patient also reports being doing nose with what sounds like a viral process in February  I suspect there may be element of post infectious IBS as well contributing to her current symptoms  I would however rule out small intestinal bacterial overgrowth  -I would also recommend checking inflammatory markers including sed rate and CRP given reports of small amounts of pink discharge  If inflammatory markers are elevated, would recommend colonoscopy for further evaluation  Otherwise would recommend SIBO breath test   -at this time I would recommend restarting the low FODMAP diet, adding a daily probiotic such as VSL 3, align or Culturelle   -would also recommend supplemental fiber 1 tbsp on daily basis   -if addition of probiotics and fiber supplement does not appear to changes symptoms, can try ib Beata   -finally, if breath kit comes back positive for SIBO, would recommend Xifaxan for 2 weeks         2  Will reassess in three months     ______________________________________________________________________    Reason for Consult / Principal Problem: [unfilled]    HPI: Karina Escudero is a 31y o  year old adult history of hypothyroidism, acne, interstitial cystitis, IBS currently on nortriptyline 10 mg once a week presents for initial evaluation  Patient reports having what she describes is a viral infection in February  Patient reports that over the past few months she has noted irregularity in her bowel movements  Patient reports that she was previously started on nortriptyline with good control of her symptoms for IBS however most recently has been noticing irregularity in her bowel habits with the use of nortriptyline which she has been using on weekly basis only  Reports days of loose bowel movements followed by constipation followed by maximum of 2-3 bowel movements during the day  Occasionally reports pink discharge with bowel movement  Denies any fresh blood  No melena  Reports having had colonoscopy most 10 years ago in [facility] at which time she was diagnosed with IBS  She has tried low FODMAP diet, reports that she has not noted any difference with reintroduction of wheat products or with reintroduction of dairy products  She was most recently also tested for celiac disease with serologic workup which was negative  TSH was within normal limits, and gap was mildly low however the remainder of the labs are normal   Reports family history of Crohn's in second-degree relatives and also in uncle with colon cancer  Review of Systems: The remainder of the review of systems was negative except for the pertinent positives noted in HPI       Historical Information   Past Medical History:   Diagnosis Date    Migraine with aura and without status migrainosus, not intractable     Ovarian cyst      Past Surgical History:   Procedure Laterality Date    FOOT SURGERY Left 2020    cysts    MANDIBLE SURGERY  2006    REPAIR LABRUM Left 2017     Social History   Social History     Substance and Sexual Activity   Alcohol Use Yes    Comment: social     Social History     Substance and Sexual Activity   Drug Use Yes    Types: Marijuana    Comment: very rarely gummie       Social History     Tobacco Use   Smoking Status Never Smoker   Smokeless Tobacco Never Used     Family History   Problem Relation Age of Onset    No Known Problems Mother     No Known Problems Father     Skin cancer Paternal Grandmother     Lung cancer Paternal Grandmother     Coronary artery disease Paternal Grandmother     Multiple myeloma Maternal Aunt     Colon cancer Maternal Uncle     Coronary artery disease Maternal Grandmother     No Known Problems Sister     Celiac disease Family        Meds/Allergies     (Not in a hospital admission)    No current facility-administered medications for this visit  No Known Allergies    Objective     Blood pressure 102/78, pulse 64, temperature 98 °F (36 7 °C), height 5' 2" (1 575 m), weight 59 kg (130 lb), SpO2 97 %  [unfilled]    PHYSICAL EXAM     GEN: well nourished, well developed, no acute distress  HEENT: anicteric, MMM, no cervical or supraclavicular lymphadenopathy  CV: RRR, no m/r/g  CHEST: CTA b/l, no WRR  ABD: +BS, soft, NT/ND, no hepatosplenomegaly  EXT: no c/c/e  SKIN: no rashes,  NEURO: aaox3    Lab Results:   No visits with results within 1 Day(s) from this visit     Latest known visit with results is:   Office Visit on 04/05/2022   Component Date Value    WBC 04/28/2022 7 91     RBC 04/28/2022 4 80     Hemoglobin 04/28/2022 14 5     Hematocrit 04/28/2022 44 9     MCV 04/28/2022 94     MCH 04/28/2022 30 2     MCHC 04/28/2022 32 3     RDW 04/28/2022 12 9     Platelets 86/17/3438 236     MPV 04/28/2022 11 0     Sodium 04/28/2022 137     Potassium 04/28/2022 4 3     Chloride 04/28/2022 106     CO2 04/28/2022 28     ANION GAP 04/28/2022 3*    BUN 04/28/2022 13     Creatinine 04/28/2022 1 08     Glucose 04/28/2022 80     Calcium 04/28/2022 9 3     AST 04/28/2022 15     ALT 04/28/2022 19     Alkaline Phosphatase 04/28/2022 50     Total Protein 04/28/2022 7 5     Albumin 04/28/2022 4 1     Total Bilirubin 04/28/2022 0 45     TSH 3RD GENERATON 04/28/2022 1 570     IgA 04/28/2022 177     Gliadin IgA 04/28/2022 4     Gliadin IgG 04/28/2022 2     Tissue Transglut Ab IGG 04/28/2022 <2     TISSUE TRANSGLUTAMINASE * 04/28/2022 <2     Endomysial IgA 04/28/2022 Negative      Imaging Studies: I have personally reviewed pertinent films in PACS                Answers for HPI/ROS submitted by the patient on 5/2/2022  Chronicity: recurrent  Onset: more than 1 month ago  Onset quality: gradual  Frequency: intermittently  Episode duration: 1 days  Progression since onset: gradually worsening  Pain location: generalized abdominal region  Pain - numeric: 3/10  Pain quality: cramping  Radiates to: does not radiate, does not radiate  anorexia: Yes  constipation: Yes  diarrhea: Yes  Aggravated by: nothing  Relieved by: bowel movements, certain positions

## 2022-05-03 NOTE — PROGRESS NOTES
Consultation -  [facility]  Tesfaye Sampson 31 y o  adult MRN: [id number]  Unit/Bed#:  Encounter: [id number]        Consults    ASSESSMENT/PLAN:     1  Cramping abdominal pain associated diarrhea along with constipation with more predominant symptoms of loose stools-has established diagnosis of IBS for many years however has had acute worsening of symptoms for the past 2-3 months  Patient also reports being doing nose with what sounds like a viral process in February  I suspect there may be element of post infectious IBS as well contributing to her current symptoms  I would however rule out small intestinal bacterial overgrowth  -I would also recommend checking inflammatory markers including sed rate and CRP given reports of small amounts of pink discharge  If inflammatory markers are elevated, would recommend colonoscopy for further evaluation  Otherwise would recommend SIBO breath test   -at this time I would recommend restarting the low FODMAP diet, adding a daily probiotic such as VSL 3, align or Culturelle   -would also recommend supplemental fiber 1 tbsp on daily basis   -if addition of probiotics and fiber supplement does not appear to changes symptoms, can try ib Beata   -finally, if breath kit comes back positive for SIBO, would recommend Xifaxan for 2 weeks  2  Will reassess in three months     ______________________________________________________________________    Reason for Consult / Principal Problem: [unfilled]    HPI: Tesfaye Samposn is a 31y o  year old adult history of hypothyroidism, acne, interstitial cystitis, IBS currently on nortriptyline 10 mg once a week presents for initial evaluation  Patient reports having what she describes is a viral infection in February  Patient reports that over the past few months she has noted irregularity in her bowel movements    Patient reports that she was previously started on nortriptyline with good control of her symptoms for IBS however most recently has been noticing irregularity in her bowel habits with the use of nortriptyline which she has been using on weekly basis only  Reports days of loose bowel movements followed by constipation followed by maximum of 2-3 bowel movements during the day  Occasionally reports pink discharge with bowel movement  Denies any fresh blood  No melena  Reports having had colonoscopy most 10 years ago in [facility] at which time she was diagnosed with IBS  She has tried low FODMAP diet, reports that she has not noted any difference with reintroduction of wheat products or with reintroduction of dairy products  She was most recently also tested for celiac disease with serologic workup which was negative  TSH was within normal limits, and gap was mildly low however the remainder of the labs are normal   Reports family history of Crohn's in second-degree relatives and also in uncle with colon cancer  Review of Systems: The remainder of the review of systems was negative except for the pertinent positives noted in HPI       Historical Information   Past Medical History:   Diagnosis Date    Migraine with aura and without status migrainosus, not intractable     Ovarian cyst      Past Surgical History:   Procedure Laterality Date    FOOT SURGERY Left 2020    cysts    MANDIBLE SURGERY  2006    REPAIR LABRUM Left 2017     Social History   Social History     Substance and Sexual Activity   Alcohol Use Yes    Comment: social     Social History     Substance and Sexual Activity   Drug Use Yes    Types: Marijuana    Comment: very rarely gummie       Social History     Tobacco Use   Smoking Status Never Smoker   Smokeless Tobacco Never Used     Family History   Problem Relation Age of Onset    No Known Problems Mother     No Known Problems Father     Skin cancer Paternal Grandmother     Lung cancer Paternal Grandmother     Coronary artery disease Paternal Grandmother     Multiple myeloma Maternal Aunt     Colon cancer Maternal Uncle     Coronary artery disease Maternal Grandmother     No Known Problems Sister     Celiac disease Family        Meds/Allergies     (Not in a hospital admission)    No current facility-administered medications for this visit  No Known Allergies    Objective     Blood pressure 102/78, pulse 64, temperature 98 °F (36 7 °C), height 5' 2" (1 575 m), weight 59 kg (130 lb), SpO2 97 %  [unfilled]    PHYSICAL EXAM     GEN: well nourished, well developed, no acute distress  HEENT: anicteric, MMM, no cervical or supraclavicular lymphadenopathy  CV: RRR, no m/r/g  CHEST: CTA b/l, no WRR  ABD: +BS, soft, NT/ND, no hepatosplenomegaly  EXT: no c/c/e  SKIN: no rashes,  NEURO: aaox3    Lab Results:   No visits with results within 1 Day(s) from this visit     Latest known visit with results is:   Office Visit on 04/05/2022   Component Date Value    WBC 04/28/2022 7 91     RBC 04/28/2022 4 80     Hemoglobin 04/28/2022 14 5     Hematocrit 04/28/2022 44 9     MCV 04/28/2022 94     MCH 04/28/2022 30 2     MCHC 04/28/2022 32 3     RDW 04/28/2022 12 9     Platelets 37/18/6288 236     MPV 04/28/2022 11 0     Sodium 04/28/2022 137     Potassium 04/28/2022 4 3     Chloride 04/28/2022 106     CO2 04/28/2022 28     ANION GAP 04/28/2022 3*    BUN 04/28/2022 13     Creatinine 04/28/2022 1 08     Glucose 04/28/2022 80     Calcium 04/28/2022 9 3     AST 04/28/2022 15     ALT 04/28/2022 19     Alkaline Phosphatase 04/28/2022 50     Total Protein 04/28/2022 7 5     Albumin 04/28/2022 4 1     Total Bilirubin 04/28/2022 0 45     TSH 3RD GENERATON 04/28/2022 1 570     IgA 04/28/2022 177     Gliadin IgA 04/28/2022 4     Gliadin IgG 04/28/2022 2     Tissue Transglut Ab IGG 04/28/2022 <2     TISSUE TRANSGLUTAMINASE * 04/28/2022 <2     Endomysial IgA 04/28/2022 Negative      Imaging Studies: I have personally reviewed pertinent films in PACS                Answers for HPI/ROS submitted by the patient on 5/2/2022  Chronicity: recurrent  Onset: more than 1 month ago  Onset quality: gradual  Frequency: intermittently  Episode duration: 1 days  Progression since onset: gradually worsening  Pain location: generalized abdominal region  Pain - numeric: 3/10  Pain quality: cramping  Radiates to: does not radiate, does not radiate  anorexia: Yes  constipation: Yes  diarrhea: Yes  Aggravated by: nothing  Relieved by: bowel movements, certain positions

## 2022-06-01 ENCOUNTER — OFFICE VISIT (OUTPATIENT)
Dept: DERMATOLOGY | Facility: CLINIC | Age: 37
End: 2022-06-01
Payer: COMMERCIAL

## 2022-06-01 VITALS — WEIGHT: 129.5 LBS | BODY MASS INDEX: 23.83 KG/M2 | HEIGHT: 62 IN | TEMPERATURE: 97.6 F

## 2022-06-01 DIAGNOSIS — L71.9 ROSACEA: ICD-10-CM

## 2022-06-01 DIAGNOSIS — L53.9 ERYTHEMA: Primary | ICD-10-CM

## 2022-06-01 PROCEDURE — 1036F TOBACCO NON-USER: CPT | Performed by: DERMATOLOGY

## 2022-06-01 PROCEDURE — 99213 OFFICE O/P EST LOW 20 MIN: CPT | Performed by: DERMATOLOGY

## 2022-06-01 NOTE — PATIENT INSTRUCTIONS
PULSE DYE LASER CONSULT   Physical Exam:  Anatomic Location Affected:  Central face  Morphological Description:  Erythema with telangectasia and papules      Additional History of Present Condition:  Currently on tretinoin 0 0255 cream,    Assessment and Plan:  Based on a thorough discussion of this condition and the management approach to it (including a comprehensive discussion of the known risks, side effects and potential benefits of treatment), the patient (family) agrees to implement the following specific plan:  Recommendations for treatments requires at least 3 office visits  Avoid direct sunlight before and after treatments  Avoid tanning bed or sunless tanning products  Pulse dye laser $300 buy 3 get the 4th one for free  Wait until after summer season to start treatment  usually 4-6 weeks per treatment  Down time 2-3 days  Okay to use mineral make-up a day after treatment  Okay if want to stop tretinoin before treatment  Recommend good moisturizing and sunscreen

## 2022-06-01 NOTE — PROGRESS NOTES
[facility] Follow Up Note    Patient Name: Arcelia Sotelo  Encounter Date: 6/1/22    Today's Chief Concerns:  Dinora Orozco Concern #1:  IPL consult    Current Medications:    Current Outpatient Medications:     levothyroxine (Euthyrox) 88 mcg tablet, Take 1 tablet (88 mcg total) by mouth daily, Disp: , Rfl:     Melatonin Gummies 2 5 MG CHEW, Chew If needed, Disp: , Rfl:     nortriptyline (PAMELOR) 10 mg capsule, Take by mouth daily at bedtime Causes constipation only takes if needed , Disp: , Rfl:     ondansetron (ZOFRAN-ODT) 4 mg disintegrating tablet, Take 1 tablet (4 mg total) by mouth every 6 (six) hours as needed for nausea or vomiting, Disp: 10 tablet, Rfl: 0    Phexxi 1 8-1-0 4 % GEL, INSERT 1 APPLICATOR INTO THE VAGINA DAILY IMMEDIATELY PRIOR TO INTERCOURSE, Disp: , Rfl:     Soolantra 1 % CREA, , Disp: , Rfl:     spironolactone (ALDACTONE) 50 mg tablet, Take 1 tablet (50 mg total) by mouth 2 (two) times a day, Disp: 180 tablet, Rfl: 3    SUMAtriptan (IMITREX) 100 mg tablet, TAKE 1 TABLET (100 MG TOTAL) BY MOUTH ONCE AS NEEDED FOR MIGRAINE FOR UP TO 1 DOSE, Disp: 10 tablet, Rfl: 0    tretinoin (RETIN-A) 0 025 % cream, , Disp: , Rfl:     CONSTITUTIONAL:   Vitals:    06/01/22 1244   Temp: 97 6 °F (36 4 °C)   TempSrc: Temporal   Weight: 58 7 kg (129 lb 8 oz)   Height: 5' 2" (1 575 m)             Specific Alerts:    Have you been seen by a [facility]atologist in the last 3 years? YES    Are you pregnant or planning to become pregnant? No    Are you currently or planning to be nursing or breast feeding? No    No Known Allergies    May we call your Preferred Phone number to discuss your specific medical information? YES    May we leave a detailed message that includes your specific medical information? YES    Have you traveled outside of the [facility] in the past 3 months? No    Do you currently have a pacemaker or defibrillator?  No    Do you have any artificial heart valves, joints, plates, screws, rods, stents, pins, etc? No   - If Yes, were any placed within the last 2 years? Do you require any medications prior to a surgical procedure? No   - If Yes, for which procedure? - If Yes, what medications to you require? Are you taking any medications that cause you to bleed more easily ("blood thinners") No    Have you ever experienced a rapid heartbeat with epinephrine? No    Have you ever been treated with "gold" (gold sodium thiomalate) therapy? No    [facility] can help with wrinkles, "laugh lines," facial volume loss, "double chin," "love handles," age spots, and more  Are you interested in learning today about some of the skin enhancement procedures that we offer? (If Yes, please provide more detail) No    Review of Systems:  Have you recently had or currently have any of the following?     · Fever or chills: No  · Night Sweats: No  · Headaches: No  · Weight Gain: No  · Weight Loss: No  · Blurry Vision: No  · Nausea: No  · Vomiting: No  · Diarrhea: No  · Blood in Stool: No  · Abdominal Pain: No  · Itchy Skin: No  · Painful Joints: No  · Swollen Joints: No  · Muscle Pain: No  · Irregular Mole: No  · Sun Burn: No  · Dry Skin: No  · Skin Color Changes: No  · Scar or Keloid: No  · Cold Sores/Fever Blisters: No  · Bacterial Infections/MRSA: No  · Anxiety: No  · Depression: No  · Suicidal or Homicidal Thoughts: No      PSYCH: Normal mood and affect  EYES: Normal conjunctiva  ENT: Normal lips and oral mucosa  CARDIOVASCULAR: No edema  RESPIRATORY: Normal respirations  HEME/LYMPH/IMMUNO:  No regional lymphadenopathy except as noted below in ASSESSMENT AND PLAN BY DIAGNOSIS    FULL ORGAN SYSTEM SKIN EXAM (SKIN)   Hair, Scalp, Ears, Face Normal except as noted below in Assessment   Neck, Cervical Chain Nodes Normal except as noted below in Assessment   Right Arm/Hand/Fingers Normal except as noted below in Assessment   Left Arm/Hand/Fingers Normal except as noted below in Assessment        PULSE DYE LASER CONSULT  FOR ERYTHEMA RELATED TO ROSACEA/ACNE  Physical Exam:   Anatomic Location Affected:  Central face   Morphological Description:  Erythema with telangectasia and papules      Additional History of Present Condition:  Currently on tretinoin 0 0255 cream,    Assessment and Plan:  Based on a thorough discussion of this condition and the management approach to it (including a comprehensive discussion of the known risks, side effects and potential benefits of treatment), the patient (family) agrees to implement the following specific plan:   Recommended against a med spa offering [employer]   Recommendations for treatments requires at least 3 office visits   Avoid direct sunlight before and after treatments   Avoid tanning bed or sunless tanning products   Pulse dye laser $300 buy 3 get the 4th one for free   Wait until after summer season to start treatment   usually 4-6 weeks per treatment  Down time 2-3 days   Okay to use mineral make-up a day after treatment   Okay if want to stop tretinoin before treatment   Recommend good moisturizing and sunscreen            Scribe Attestation    I,:  Tori Okeefe am acting as a scribe while in the presence of the attending physician :       I,:  Marely Ha MD personally performed the services described in this documentation    as scribed in my presence :

## 2022-06-07 ENCOUNTER — TELEPHONE (OUTPATIENT)
Dept: GASTROENTEROLOGY | Facility: CLINIC | Age: 37
End: 2022-06-07

## 2022-06-07 NOTE — TELEPHONE ENCOUNTER
LM advising pt can drop off SIBO kit Monday through Friday between 8:30am and 3:30pm  Advised to call with any questions

## 2022-06-07 NOTE — TELEPHONE ENCOUNTER
Patients GI provider:  Dr Millie Muñiz     Number to return call: [phone]    Reason for call: Pt called to schedule a Sibo drop off appointment  Patient also asked if she is able to do this over the weekend and drop it off on a Monday?      Scheduled procedure/appointment /date if applicable: 14/54/6507

## 2022-06-13 ENCOUNTER — TELEPHONE (OUTPATIENT)
Dept: GASTROENTEROLOGY | Facility: AMBULARY SURGERY CENTER | Age: 37
End: 2022-06-13

## 2022-06-14 ENCOUNTER — OFFICE VISIT (OUTPATIENT)
Dept: GASTROENTEROLOGY | Facility: CLINIC | Age: 37
End: 2022-06-14
Payer: COMMERCIAL

## 2022-06-14 ENCOUNTER — APPOINTMENT (OUTPATIENT)
Dept: LAB | Facility: CLINIC | Age: 37
End: 2022-06-14
Payer: COMMERCIAL

## 2022-06-14 DIAGNOSIS — R19.7 DIARRHEA, UNSPECIFIED TYPE: ICD-10-CM

## 2022-06-14 DIAGNOSIS — K58.2 IRRITABLE BOWEL SYNDROME WITH BOTH CONSTIPATION AND DIARRHEA: ICD-10-CM

## 2022-06-14 LAB
CRP SERPL QL: <3 MG/L
ERYTHROCYTE [SEDIMENTATION RATE] IN BLOOD: 10 MM/HOUR

## 2022-06-14 PROCEDURE — 91065 BREATH HYDROGEN/METHANE TEST: CPT | Performed by: INTERNAL MEDICINE

## 2022-06-14 PROCEDURE — 36415 COLL VENOUS BLD VENIPUNCTURE: CPT

## 2022-06-14 PROCEDURE — 86140 C-REACTIVE PROTEIN: CPT

## 2022-06-14 PROCEDURE — 85652 RBC SED RATE AUTOMATED: CPT

## 2022-06-14 NOTE — Clinical Note
Giles Colmenares's SIBO test is positive, please let me know if you have any questions - Piero Holman

## 2022-06-15 ENCOUNTER — TELEPHONE (OUTPATIENT)
Dept: GASTROENTEROLOGY | Facility: CLINIC | Age: 37
End: 2022-06-15

## 2022-06-15 DIAGNOSIS — K63.89 SMALL INTESTINAL BACTERIAL OVERGROWTH (SIBO): Primary | ICD-10-CM

## 2022-06-15 NOTE — TELEPHONE ENCOUNTER
----- Message from Catherine Xie MD sent at 6/15/2022  4:56 PM EDT -----  Please inform the patient that her breath test is positive for small intestinal bacterial overgrowth  Would recommend Xifaxan for 2 weeks  She should also continue low FODMAP diet    Thank you  ----- Message -----  From: Lee Lee DO  Sent: 6/15/2022  11:44 AM EDT  To: Catherine Xie MD    Hi Marie Colmenares's SIBO test is positive, please let me know if you have any questions - Nupur Andrews

## 2022-06-15 NOTE — PROGRESS NOTES
[facility] SPECIALTY [facility]       Bacterial Overgrowth Analytical Record    Rosey Alvares 31 y o  adult MRN: [id number]      Date of Test: 6/12/22    Substrate Given: Lactulose    Ordering Provider: Dante Dixon MD    Medical Assistant: TUSHAR    Symptoms: diarrhea    The patient presents for bacterial overgrowth testing  Patient fasted overnight  Baseline readings obtained  Breath test performed every 20 min for a total of 3 hr    Sample Clock Time ppmH2 ppmCH4 Co2% Beck   Baseline   7:30 2 2 3 6 1 52   #1  20 minutes 7:52 6 3 3 8 1 44   #2  40 minutes 8:12 28 4 3 9 1 41   #3  60 minutes 8:32 42 5 4 0 1 37   #4  80 minutes 8:52 53 6 3 7 1 48   #5  100 minutes 9:12 80 6 3 7 1 48   #6  120 minutes 9:32 84 6 3 8 1 44   #7  140 minutes 9:52 85 7 3 3 1 66   #8  160 minutes 10:12 75 7 3 7 1 48   #9  180 minutes 10:32 62 8 3 6 1 52     Please forward to ordering provider once reviewed        Physician interpretation:   Positive breath test for small intestinal bacterial overgrowth with a hydrogen increase of >20 ppm by the 40 minute connie    Results forwarded to ordering physician

## 2022-06-16 NOTE — TELEPHONE ENCOUNTER
Spoke with patient, reviewed SIBO test was positive and recommendations to start xifaxan for 14 days  Patient verbalized understanding, no further questions

## 2022-06-20 DIAGNOSIS — E03.9 ACQUIRED HYPOTHYROIDISM: Primary | ICD-10-CM

## 2022-06-27 ENCOUNTER — OFFICE VISIT (OUTPATIENT)
Dept: DERMATOLOGY | Facility: CLINIC | Age: 37
End: 2022-06-27
Payer: COMMERCIAL

## 2022-06-27 VITALS — WEIGHT: 132 LBS | TEMPERATURE: 97.2 F | BODY MASS INDEX: 24.29 KG/M2 | HEIGHT: 62 IN

## 2022-06-27 DIAGNOSIS — L85.8 KERATOSIS PILARIS: ICD-10-CM

## 2022-06-27 DIAGNOSIS — L70.0 ACNE VULGARIS: ICD-10-CM

## 2022-06-27 DIAGNOSIS — L53.9 ERYTHEMA: Primary | ICD-10-CM

## 2022-06-27 DIAGNOSIS — L71.9 ROSACEA: ICD-10-CM

## 2022-06-27 PROCEDURE — 99213 OFFICE O/P EST LOW 20 MIN: CPT | Performed by: DERMATOLOGY

## 2022-06-27 PROCEDURE — 3008F BODY MASS INDEX DOCD: CPT | Performed by: DERMATOLOGY

## 2022-06-27 RX ORDER — SPIRONOLACTONE 50 MG/1
50 TABLET, FILM COATED ORAL 2 TIMES DAILY
Qty: 180 TABLET | Refills: 1 | Status: SHIPPED | OUTPATIENT
Start: 2022-06-27 | End: 2022-09-25

## 2022-06-27 NOTE — PROGRESS NOTES
[facility] Follow Up Note    Patient Name: Albaro Haynes  Encounter Date: 06/27/2022    Today's Chief Concerns:  Osker Ok Concern #1:  Follow up acne/ Rosacea     Concern #2:  Irritation on arms and ears       Current Medications:    Current Outpatient Medications:     levothyroxine (Euthyrox) 88 mcg tablet, Take 1 tablet (88 mcg total) by mouth daily, Disp: , Rfl:     Melatonin Gummies 2 5 MG CHEW, Chew If needed, Disp: , Rfl:     nortriptyline (PAMELOR) 10 mg capsule, Take by mouth daily at bedtime Causes constipation only takes if needed , Disp: , Rfl:     ondansetron (ZOFRAN-ODT) 4 mg disintegrating tablet, Take 1 tablet (4 mg total) by mouth every 6 (six) hours as needed for nausea or vomiting, Disp: 10 tablet, Rfl: 0    Phexxi 1 8-1-0 4 % GEL, INSERT 1 APPLICATOR INTO THE VAGINA DAILY IMMEDIATELY PRIOR TO INTERCOURSE, Disp: , Rfl:     rifaximin (XIFAXAN) 550 mg tablet, Take 1 tablet (550 mg total) by mouth every 8 (eight) hours for 14 days, Disp: 42 tablet, Rfl: 0    Soolantra 1 % CREA, , Disp: , Rfl:     spironolactone (ALDACTONE) 50 mg tablet, Take 1 tablet (50 mg total) by mouth 2 (two) times a day, Disp: 180 tablet, Rfl: 3    SUMAtriptan (IMITREX) 100 mg tablet, TAKE 1 TABLET (100 MG TOTAL) BY MOUTH ONCE AS NEEDED FOR MIGRAINE FOR UP TO 1 DOSE, Disp: 10 tablet, Rfl: 0    tretinoin (RETIN-A) 0 025 % cream, , Disp: , Rfl:     CONSTITUTIONAL:   Vitals:    06/27/22 1009   Temp: (!) 97 2 °F (36 2 °C)   TempSrc: Temporal   Weight: 59 9 kg (132 lb)   Height: 5' 2" (1 575 m)         Specific Alerts:    Have you been seen by a [facility] Dermatologist in the last 3 years? YES    Are you pregnant or planning to become pregnant? No    Are you currently or planning to be nursing or breast feeding? No    No Known Allergies    May we call your Preferred Phone number to discuss your specific medical information?  YES    May we leave a detailed message that includes your specific medical information? YES    Have you traveled outside of the [facility] in the past 3 months? No    Do you currently have a pacemaker or defibrillator? No    Do you have any artificial heart valves, joints, plates, screws, rods, stents, pins, etc? No   - If Yes, were any placed within the last 2 years? Do you require any medications prior to a surgical procedure? No      Are you taking any medications that cause you to bleed more easily ("blood thinners") No    Have you ever experienced a rapid heartbeat with epinephrine? No    Have you ever been treated with "gold" (gold sodium thiomalate) therapy? No    [address]  Dermatology can help with wrinkles, "laugh lines," facial volume loss, "double chin," "love handles," age spots, and more  Are you interested in learning today about some of the skin enhancement procedures that we offer? (If Yes, please provide more detail) No    Review of Systems:  Have you recently had or currently have any of the following?     · Fever or chills: No  · Night Sweats: No  · Headaches: No  · Weight Gain: No  · Weight Loss: No  · Blurry Vision: No  · Nausea: No  · Vomiting: No  · Diarrhea: YES  · Blood in Stool: No  · Abdominal Pain: No  · Itchy Skin: No  · Painful Joints: No  · Swollen Joints: No  · Muscle Pain: No  · Irregular Mole: No  · Sun Burn: No  · Dry Skin: No  · Skin Color Changes: No  · Scar or Keloid: No  · Cold Sores/Fever Blisters: No  · Bacterial Infections/MRSA: No  · Anxiety: No  · Depression: No  · Suicidal or Homicidal Thoughts: No      PSYCH: Normal mood and affect  EYES: Normal conjunctiva  ENT: Normal lips and oral mucosa  CARDIOVASCULAR: No edema  RESPIRATORY: Normal respirations  HEME/LYMPH/IMMUNO:  No regional lymphadenopathy except as noted below in ASSESSMENT AND PLAN BY DIAGNOSIS    FULL ORGAN SYSTEM SKIN EXAM (SKIN)  Hair, Scalp, Ears, Face Normal except as noted below in Assessment   Neck, Cervical Chain Nodes Normal except as noted below in Assessment   Right Arm/Hand/Fingers Normal except as noted below in Assessment   Left Arm/Hand/Fingers Normal except as noted below in Assessment   Chest/Breasts/Axillae Viewed areas Normal except as noted below in Assessment   Abdomen, Umbilicus Normal except as noted below in Assessment   Back/Spine Normal except as noted below in Assessment   Groin/Genitalia/Buttocks Viewed areas Normal except as noted below in Assessment   Right Leg, Foot, Toes Normal except as noted below in Assessment   Left Leg, Foot, Toes Normal except as noted below in Assessment     ACNE VULGARIS ("COMMON ACNE") AND ROSACEA     Physical Exam:   Anatomic Location Affected:  Face, cheeks, chin   Morphological Description:  o Open/Closed Comedones:  - Rare ("Almost Clear")  o Inflammatory Papules/Pustules:  - Rare ("Almost Clear")  o Nodules:  - No evidence ("Clear")  o Scarring:  - Rare ("Almost Clear")  o Excoriations:  - No evidence ("Clear")  o Local Skin Redness/Erythema:  - Few ("Mild")  o Local Skin Dryness/Scaling:  - No evidence ("Clear")  o Local Skin Dyspigmentation:  - No evidence ("Clear")      Additional History of Present Condition:  Patient was given spironolactone 50 daily, tretinoin at night and Soolantra in the morning  She states she did a course of  chemical peels in the past 4 months at [facility] plastic surgery [place]  Breakouts are minimal  She states while in the [place] she did a 3 courses of Vbeam lasers treatment to help with redness last spring  Not much results   She also did a laser treatment to help with texture but not sure of the name  Treatment plan:Based on a thorough discussion of this condition and the management approach to it (including a comprehensive discussion of the known risks, side effects and potential benefits of treatment), the patient (family) agrees to implement the following specific plan:     Topical medications:        Morning: Continue Soolantra cream to entire face in the morning as directed         Night: Continue tretinoin 0 025% cream pea size amount to entire face at night    Oral medications:        Morning and Night : Continue Spironolactone 50 mg twice a day by mouth           · IPL or PDL discussed further   · Reassured that is not in her expertise and will go with what was recommended during Cosmetic consult with Dr Mitch Villalba   · Added to recall list for laser treatment in the fall         REMEMBER:  Always take your acne pills with lots of water! A pill stuck in your throat can cause significant burning and irritation  Drink a full glass of water to ensure the pill gets into your stomach  Avoid popping a pill right before bed, and stay upright for at least 1 hour after taking a pill  ACNE:  WHAT ZIT ALL ABOUT? WHY DO I HAVE ACNE/PIMPLES? Your skin is made of layers  To keep the skin from becoming dry and cracked, the skin needs oil  The oil is made in little wells in the deeper layers in the skin  People with acne have glands that make more oil and are more easily plugged, causing the glands to swell  Hormones, bacteria and your inherited tendency to have acne all play a role  The medical term for pimples is acne or acne vulgaris (vulgaris means common)  Most people get some acne  Acne does not come from being dirty  Instead, it is an expected consequence of changes that occur during normal growth and development  Hormones, bacteria, and your family's tendency to have acne may all play a role  Whiteheads or blackheads are openings of the glands (glands are the oil factories) onto the surface of the skin  Blackheads are not caused by dirt blocking the pores; instead, they result from the oxidation reaction of oil and skin in the pores with the air (like a rust reaction)  WHAT ABOUT STRESS? Stress does not cause acne but it can make it worse  Make sure you get enough sleep and daily exercise! WHAT ABOUT FOODS/DIET?   Try to eat a balanced, healthy diet  Some people feel that certain foods worsen their acne  While there aren't many studies available on this question, severe dietary changes are unlikely to help your acne and may be harmful to the health of your skin  If you find that a certain food seems to aggravate your acne, you may consider avoiding that food  Discuss this with your physician! WHAT CAUSES MY ACNE? There are four contributors to acne--the body's natural oil (sebum), clogged pores, bacteria (with the scientific name Propionibacterium acnes, or P  acnes, for short), and the body's reaction to the bacteria living in the clogged pores (which causes inflammation)  Here's what happens:     Sebum is produced in the normal oil-making glands in the deeper layers of the skin and reaches the surface through the skin's pores  An increase in certain hormones occurs around the time of puberty, and these hormones trigger the oil glands to produce increased amounts of sebum   Pores with excess oil tend to become clogged more easily   At the same time, P  acnes--one of the many types of bacteria that normally live on everyone's skin--thrives in the excess oil and causes a skin reaction (inflammation)   If a pore is clogged close to the surface, there is little inflammation  However, this results in the formation of whiteheads (closed comedones) or blackheads (open comedones) at the surface of the skin   A plug that extends to, or forms a little deeper in the pore, or one that enlarges or ruptures may cause more inflammation  The result is red bumps (papules) and pus-filled pimples (pustules)   If plugging happens in the deepest skin layer, the inflammation may be even more severe, resulting in the formation of nodules or cysts  When these types of acne heal, they may leave behind discolored areas or true scars  SKIN HYGIENE:  HOW SHOULD I [address] MY SKIN?   Acne does not come from being dirty, however, washing your face is part of taking good care of your skin and will help keep your face clear  Good skin hygiene is, therefore, critical to support any acne treatment plan  Here are several specific suggestions for practicing good skin hygiene and keeping your skin looking its best:     You should wash acne-prone skin TWICE A DAY: Once in the morning and once in the evening  This does include any showers you take that day, so do not overdo it!  Do not scrub the skin with a washcloth or loofah as these can irritate and inflame your acne  Acne does not come from dirt, so it is not necessary to scrub the skin clean  In fact, scrubbing may lead to dryness and irritation that makes the acne even worse and harder for patients to tolerate acne medications   Use a gentle facial moisturizing cleanser (Cetaphil Moisturizing Cleanser or Dove Fragrance-Free bar)  Avoid using soaps like Sagar Ramirezalam, [address] [facility], or soft/liquid soaps as these products will dry your skin   Do not use any over-the-counter acne washes without your doctor's specific instruction to do so  These products often contain salicylic acid or benzoyl peroxide  These ingredients can be helpful in clearing oil from the skin and reducing bacteria, but they may also be drying and can add to irritation   Do not use exfoliating products with microbeads or brushes as these can cause irritation to the skin which can worsen the acne   Facials and other treatments to remove, squeeze, or clean out pores, if done by a , can help the acne  They should not be done more than every 8 weeks   Try not to pop pimples or pick at your acne as this can delay healing and may result in scarring or skin color changes (dark spots) that are often more noticeable than the acne itself  Picking/popping acne can also cause a serious skin infection   Wash or change your pillow case once to twice a week, especially if you use products in your hair   Wash the skin as soon as possible after playing sports or other activities that cause a lot of sweating  Also, pay attention to how your sports equipment (shoulder pads, helmet strap, etc ) might be making your acne worse   When you use makeup, moisturizer, or sunscreen make sure that these products are labeled non-comedogenic, or won't clog pores, or won't cause acne             WHAT ACNE TREATMENTS ARE AVAILABLE? Medications for acne try to stop the formation of new pimples by reducing or removing the oil, bacteria, and other things (like dead skin cells) that clog the pores  They can also decrease the inflammation or irritation response of the skin to bacteria  It may take from 6 to 8 weeks (about 2 months!) before you see any improvement and know if the medication is effective  It takes the layers of skin this long to regenerate  Remember, these medications do not cure the condition--the acne improves because of the medication  Therefore, treatment must be continued in order to prevent the return of acne lesions  There are many types of acne treatments  Some are applied to the skin (topical medications) and some are taken by mouth (oral medications)  In most cases of mild acne, the doctor will start with a topical medication  There are many different topical medications that are helpful for acne  If acne is more severe and it does not respond adequately to a topical medication, or if it covers large body surface areas such as the back and/or chest, oral antibiotics such as Doxycycline or Minocycline and/or oral hormone therapy such as Oral Contraceptive Pills or Spironolactone may be prescribed  In the most severe cases, isotretinoin (Accutane) may be used  In general, it is usually best to start with acne medications that are least likely to cause side effects but are at the same time capable of addressing the specific causes for the acne   Some patients have a good result with just one medication, but many will need to use a combination of treatments: two or more different topical agents or an oral medication plus a topical medication  Another treatment used for acne may include corticosteroid injections, which are used to help relieve pain, decrease the size, and encourage the healing of large, inflamed acne nodules  Also, dermatologists sometimes perform acne surgery, using a fine needle, a pointed blade, or an instrument known as a comedone extractor to mechanically clean out clogged pores  One must always weigh the risk for inducing a scar with the potential benefits of any procedure  Prior treatment with topical retinoids can loosen whiteheads and blackheads and make it easier to physically remove such lesions  Heat-based devices, and light and laser therapy are being studied to see whether there is any role for such treatments in mild to moderate acne  At this time, there is not enough evidence to make general recommendations about their use  TOPICAL ACNE MEDICATIONS    WHAT KIND OF TOPICALS ARE THERE?  Benzoyl peroxide (BP) helps to fight inflammation and is anti-microbial (kills bacteria, viruses, and other microorganisms) and is believed to help prevent resistance of bacteria to topical antibiotics  A benzoyl peroxide wash may be recommended for use on large areas such as the chest and/or back  Mild irritation and dryness are common when first using benzoyl peroxide-containing products  Be careful because benzoyl peroxide can bleach towels and clothing!  Retinoids (such as adapalene, tretinoin, or tazarotene) unplug the oil glands by helping peel away the layers of skin and other things plugging the opening of the glands  Mild irritation and dryness are common when first using these products  Facial waxing and other skin procedures can lead to excessive irritation and should be avoided during retinoid therapy     Antibiotics fight bacteria and help decrease inflammation  Topical antibiotics commonly used in acne include clindamycin, erythromycin, and combination agents (such as clindamycin/benzoyl peroxide or erythromycin/benzoyl peroxide)  Mild irritation and dryness are common when first using these products  Typically, topical antibiotics should not be used alone as treatment for acne   Other topical agents include salicylic acid, azelaic acid, dapsone, and sulfacetamide  Mild irritation and dryness can also occur when first using these products  USING YOUR TOPICAL TREATMENTS LIKE A PRO   Apply topical medications only to clean, dry skin  Topical medications may lead to significant dryness of the affected areas  To minimize this, wait 15-20 minutes after washing before applying your topical medication   These medications work deep in the skin to prevent new breakouts  Spot treatment of individual pimples does not do much  When applying topical medications to the face, use the 5-dot method  Start by placing a small pea-sized amount of the medication on your finger  Then, place dots in each of five locations of your face: Mid-forehead, each cheek, nose, and chin  Next, rub the medication into the entire area of skin - not just on individual pimples! Try to avoid the delicate skin around your eyes and corners of your mouth   The medications are not magic! They take weeks if not months to work  Be patient and use your medicine on a daily basis or as directed for six weeks before asking if your skin looks better  Try not to miss more than one or two days each week when using your medications   If you are starting a new medication, then try using it every other night or even every third night   Gradually work up to [employer] a day    This will give your skin time to adjust    The same medications often come in various forms or formulations: Creams, ointments, lotions, gels, microspheres, or foams   Use the formulation that has been recommended and don't switch to other forms unless instructed  Some forms (such as alcohol based gels) may be more drying and less tolerable for certain skin types   Sometimes individual medications are not as effective as a combination of two or more agents  The doctor may need to try several medications or combinations before finding the one that is best for that patient   Moisturizer, sunscreen, and make-up may be used in conjunction with topical acne medications  In general, acne medications are applied first so they may directly contact the skin  Ask your physician to review specific application instructions!  It is especially important to always use sunscreen when using a topical retinoid or oral antibiotic  These drugs can make your skin more sensitive to the sun  In general, sunscreen gets applied AFTER any acne medications   Don't stop using your acne medications just because your acne got better  Remember, the acne is better because of the medication, and prevention is the keita to treatment  ORAL ACNE MEDICATIONS    ORAL ANTIBIOTICS  Antibiotics include tetracycline-class medicines (which include the most commonly used oral antibiotics for acne, minocycline, and doxycycline), erythromycin, trimethoprim-sulfamethoxazole, and occasionally cephalexin or azithromycin  These drugs may decrease bacteria and inflammation, and they are most effective for moderate-to-severe inflammatory acne  A product containing benzoyl peroxide should be used along with these antibiotics to help decrease the possibility of microbial resistance  Always take your acne pills with lots of water! A pill stuck in your throat can cause significant burning and irritation  Drink a full glass of water to ensure the pill gets into your stomach  Avoid popping a pill right before bed, and stay upright for at least 1 hour after taking a pill      HORMONAL THERAPY  Hormonal treatment is used only in females and usually consists of oral contraceptives (birth control pills)  Spironolactone is also sometimes used  ORAL CONTRACEPTIVE PILLS   This medication is also known as the Birth Control Pill    We use it for hormonal regulation of acne  Take this medication as directed on the medication packet  NOTE: Try to find a regular time in your day to take the pill so that you don't forget  The best time is about half an hour after a meal or snack, or at bedtime  If you do forget to take your daily pill at the regular time, take one as soon as you remember and take the next at your regular scheduled time  WARNING: Do not take this medication until discussing it with your physician if you smoke, are pregnant (or trying to become pregnant or could be pregnant), have a personal history of breast cancer, have any artificial hardware or implants, have a condition called Factor 5 Leiden deficiency, have a family history of clotting problems, regularly have migraine headaches (especially with aura or due to flashing lights), or have any vaginal bleeding other than that associated with your menstrual cycle  SPIRONOLACTONE  This medication was originally used as a "water-pill" for patients with high blood pressure  Dermatologists use it in low doses to block the effects of male hormone on the hair follicle and oil glands  ORAL ISOTRETINOIN (used to be called the brand name Vernia Learn)  Isotretinoin, a derivative of vitamin A, is a powerful drug with several significant potential side effects  It is reserved for acne which is severe or when other medications have not worked well enough  It used to be sold under the brand name Accutane but now several versions exist       HAVING PROBLEMS WITH ANY OF YOUR TREATMENTS? You should not be able to see any of the medicines on your face   If you can see a white film on your skin after you apply the medication, there is too much medicine in that area and you need to apply a thinner coat and make sure it is spread evenly on your face  If your skin gets too dry, you can apply a light (non-comedogenic) moisturizer on top of your medicine or you may switch to using the medicine every other day instead of every day  If your skin is still too irritated, you may need to switch to a milder medication  If your skin is red and very itchy, you may be allergic to the medication and you should stop using it  COMMON POSSIBLE SIDE EFFECTS OF MEDICATIONS     Retinoids - dryness, redness, increased sun sensitivity   Spironolactone - headache, upset stomach, dizziness, breast tenderness or irregular periods  Usually these effects lessen with time   Benzoyl peroxide - drying, redness, bleaching of clothes, towels and sheets, allergy   Doxycycline - headaches; dizziness; irritation of the throat; nail changes; discoloration of teeth   Sun sensitivity - even if you have dark skin, this medicine can make you burn more easily  Make sure you protect yourself from the sun, either by avoiding being outside between 11 AM and 3 PM, wearing and reapplying sunscreen/sunblock, or wearing sun protective clothing   Nausea/vomiting - if you experience nausea with this medication, take it with food   Minocycline - headaches; dizziness; vision problems,  irritation of the throat; discoloration of scars, gums, or teeth  Can rarely cause liver disease, joint pains, and flu-like symptoms   If you should notice yellowing of the skin or any of the above, notify your doctor and stop using the medication   Birth Control Pills - nausea; headaches; breast tenderness; feeling bloated; mood changes   Spotting between periods may occur for the first three weeks of the medication, but this is not serious  It may last for two or three cycles  Please call us if the bleeding is heavier than a light flow or lasts for more than a few days  WHEN AND WHERE TO CALL WITH CONCERNS  We are here to help!   If you experience any unusual symptoms, then stop taking or using the medication and call our office at [phone] (SKIN)  It is better to be safe than to be sorry! KERATOSIS PILARIS    Physical Exam:   Anatomic Location Affected:  Bilateral arms    Morphological Description:  4-7QF maria esther-follicular pinkish-red papules    Pertinent Positives:   Pertinent Negatives: Additional History of Present Condition:  She has tried amlactin lotion  Does not seem to be getting worse but not going away    Assessment and Plan:  Based on a thorough discussion of this condition and the management approach to it (including a comprehensive discussion of the known risks, side effects and potential benefits of treatment), the patient (family) agrees to implement the following specific plan:   Continue Amlactin lotion as needed daily (over the counter)   Can try Cerave salicylic acid lotion over the counter daily as well    Keratosis pilaris is a very common condition that appears as tiny bumps on the skin that may look like goosebumps or small pimples  These bumps are composed of small plugs of dead skin cells and are most commonly found over the upper arms and thighs  Children may also find bumps on their cheeks  Keratosis pilaris is harmless and affects up to half of normal children and up to three quarters of children who have ichthyosis vulgaris (a dry skin condition due to filaggrin gene mutations)  It is also more common in children with atopic eczema  Common symptoms of keratosis pilaris begin before age 3 or during the teenage years   Bumps over the outer aspect of upper arms and thighs symmetrically that feel like sandpaper   Potentially over buttocks, sides of cheeks, forearms, and upper back   Scaly, skin colored or red spots in keratosis pilaris rubra   Non-painful, but potential to be itchy     Keratosis pilaris is caused by abnormal growth of skin cells lining the upper portion of the hair follicles   Instead of naturally sloughing off, scaly skin will pile up and fill the follicle  There is a strong association with genetics, meaning that the condition has a high chance of being inherited if one or both parents are affected  It can also occur as a side effect of cancer therapies such as vemurafenib  Treating dry skin often helps as dry skin can cause the bumps to be more noticeable  Many people notice that the bumps disappear in the summer months when there is more moisture in the air  Sometimes, keratosis pilaris fades as one grows older, but puberty and hormonal therapy can cause flare-ups  If itch, dryness, or appearance bother you, treatment options include:   Using non-soap cleansers to minimize dryness of the skin    Exfoliation in the shower using a pumice stone or exfoliating sponge   Ammonium lactate cream or lotion (12%)    A moisturizing cream or ointment applied at least 2-3x a day and after bathing   o Creams containing urea or lactic acid are especially helpful    Other medicines that remove dead skin cells can also be effective   o Alpha hydroxyl acid  o Glycolic acid  o Retinoids (adapalene, retinol, tazarotene, trentinoin)  o Salicylic acid   Pulse dye laser treatments or intense pulsed light can reduce redness temporarily, but not roughness    Laser assisted hair removal     DERMATITIS   Physical Exam:   Anatomic Location Affected:  Clear today    Morphological Description:  Normal skin today    Pertinent Positives:   Pertinent Negatives:     Additional History of Present Condition:  Irritation appears after wearing certain jewlery     Assessment and Plan:  Based on a thorough discussion of this condition and the management approach to it (including a comprehensive discussion of the known risks, side effects and potential benefits of treatment), the patient (family) agrees to implement the following specific plan:   Reassured there may be possible gold allergy    Advised to try [employer] when piercing       Scribe Attestation    I,:  Bryant Holguin am acting as a scribe while in the presence of the attending physician :       I,:  Ivory Stevens MD personally performed the services described in this documentation    as scribed in my presence :

## 2022-06-27 NOTE — PATIENT INSTRUCTIONS
ACNE VULGARIS ("COMMON ACNE") AND ROSACEA     Treatment plan:Based on a thorough discussion of this condition and the management approach to it (including a comprehensive discussion of the known risks, side effects and potential benefits of treatment), the patient (family) agrees to implement the following specific plan: Topical medications:        Morning: Continue Soolantra cream to entire face in the morning as directed         Night: Continue tretinoin 0 025% cream pea size amount to entire face at night    Oral medications:        Morning and Night : Continue Spironolactone 50 mg twice a day by mouth           IPL or PDL discussed further   Reassured that is not in her expertise and will go with what was recommended during Cosmetic consult with Dr Emeli Rodriguez   Added to recall list for fall schedule for Laser treatment         REMEMBER:  Always take your acne pills with lots of water! A pill stuck in your throat can cause significant burning and irritation  Drink a full glass of water to ensure the pill gets into your stomach  Avoid popping a pill right before bed, and stay upright for at least 1 hour after taking a pill  KERATOSIS PILARIS    Assessment and Plan:  Based on a thorough discussion of this condition and the management approach to it (including a comprehensive discussion of the known risks, side effects and potential benefits of treatment), the patient (family) agrees to implement the following specific plan:  Continue Amlactin lotion as needed daily (over the counter)  Can try Cerave salicylic acid lotion over the counter daily as well    Keratosis pilaris is a very common condition that appears as tiny bumps on the skin that may look like goosebumps or small pimples  These bumps are composed of small plugs of dead skin cells and are most commonly found over the upper arms and thighs  Children may also find bumps on their cheeks   Keratosis pilaris is harmless and affects up to half of normal children and up to three quarters of children who have ichthyosis vulgaris (a dry skin condition due to filaggrin gene mutations)  It is also more common in children with atopic eczema       DERMATITIS      Assessment and Plan:  Based on a thorough discussion of this condition and the management approach to it (including a comprehensive discussion of the known risks, side effects and potential benefits of treatment), the patient (family) agrees to implement the following specific plan:  Reassured there may be possible gold allergy   Advised to try Merced[employer] Hedges when piercing

## 2022-06-28 ENCOUNTER — NURSE TRIAGE (OUTPATIENT)
Dept: OTHER | Facility: OTHER | Age: 37
End: 2022-06-28

## 2022-06-28 NOTE — TELEPHONE ENCOUNTER
Reason for Disposition   MILD diarrhea (e g , 1-3 or more stools than normal in past 24 hours) diarrhea without known cause and present > 7 days    Answer Assessment - Initial Assessment Questions  1  DIARRHEA SEVERITY: "How bad is the diarrhea?" "How many extra stools have you had in the past 24 hours than normal?"     - NO DIARRHEA (SCALE 0)    - MILD (SCALE 1-3): Few loose or mushy BMs; increase of 1-3 stools over normal daily number of stools; mild increase in ostomy output  -  MODERATE (SCALE 4-7): Increase of 4-6 stools daily over normal; moderate increase in ostomy output  * SEVERE (SCALE 8-10; OR 'WORST POSSIBLE'): Increase of 7 or more stools daily over normal; moderate increase in ostomy output; incontinence  2x/daily     2  ONSET: "When did the diarrhea begin?"       Started over 2 weeks ago after starting taking Xifaxan    3  BM CONSISTENCY: "How loose or watery is the diarrhea?"       Loose     4  VOMITING: "Are you also vomiting?" If Yes, ask: "How many times in the past 24 hours?"       No     5  ABDOMINAL PAIN: "Are you having any abdominal pain?" If Yes, ask: "What does it feel like?" (e g , crampy, dull, intermittent, constant)       Denies abdominal pain, has some cramping with diarrhea     6  ABDOMINAL PAIN SEVERITY: If present, ask: "How bad is the pain?"  (e g , Scale 1-10; mild, moderate, or severe)    - MILD (1-3): doesn't interfere with normal activities, abdomen soft and not tender to touch     - MODERATE (4-7): interferes with normal activities or awakens from sleep, tender to touch     - SEVERE (8-10): excruciating pain, doubled over, unable to do any normal activities       Denies     7  ORAL INTAKE: If vomiting, "Have you been able to drink liquids?" "How much fluids have you had in the past 24 hours?"      Increasing oral intake     8   HYDRATION: "Any signs of dehydration?" (e g , dry mouth [not just dry lips], too weak to stand, dizziness, new weight loss) "When did you last urinate?"      No signs     9  EXPOSURE: "Have you traveled to a foreign country recently?" "Have you been exposed to anyone with diarrhea?" "Could you have eaten any food that was spoiled?"     No     10  ANTIBIOTIC USE: "Are you taking antibiotics now or have you taken antibiotics in the past 2 months?"       On Xifaxan     11  OTHER SYMPTOMS: "Do you have any other symptoms?" (e g , fever, blood in stool) no other symptoms          12   PREGNANCY: "Is there any chance you are pregnant?" "When was your last menstrual period?"        No    Protocols used: DIARRHEA-ADULT-[place]

## 2022-06-28 NOTE — TELEPHONE ENCOUNTER
Patient calling in stating that she has been having diarrhea since she began her xifaxan a couple weeks ago  She states she has been having some mild cramping along with it but no severe abdominal pain  She has 2 days left of the medication and leaves this afternoon for a flight to [facility] so she would like to know if she could take pepto bismol or immodium along with her medication  Please follow up to let patient know if this is okay to take with Xifaxan

## 2022-06-28 NOTE — TELEPHONE ENCOUNTER
Regarding: diarrhea  ----- Message from [facility] sent at 6/28/2022  9:29 AM EDT -----  "I have been experiencing diarrhea and an upset stomach "

## 2022-06-28 NOTE — TELEPHONE ENCOUNTER
I spoke to patient on the phone she is having 3 bowel movements a day consistency varies from liquid form  Explained to patient that she may take Pepto-Bismol or Imodium as needed  Also recommended patient start probiotic daily and fiber supplement Simmie Lav as recommended by Dr Barbara Heard on previous office visit  Patient will be in [facility] in told July 7th and has a follow-up appointment scheduled with Dr Barbara Heard on the 13th  I did review signs and symptoms of C diff with patient but she is currently having none of these signs or symptoms  Patient is not having any abdominal cramping/pain, nausea, or vomiting  Patient does not report any foul-smelling odor associated with bowel movements  I will forward this message to Dr Barbara Heard for she can see it when she comes back from vacation    Thank you

## 2022-07-12 ENCOUNTER — OFFICE VISIT (OUTPATIENT)
Dept: GASTROENTEROLOGY | Facility: AMBULARY SURGERY CENTER | Age: 37
End: 2022-07-12
Payer: COMMERCIAL

## 2022-07-12 VITALS
HEART RATE: 74 BPM | WEIGHT: 131.2 LBS | OXYGEN SATURATION: 97 % | HEIGHT: 62 IN | DIASTOLIC BLOOD PRESSURE: 64 MMHG | SYSTOLIC BLOOD PRESSURE: 106 MMHG | BODY MASS INDEX: 24.14 KG/M2

## 2022-07-12 DIAGNOSIS — K58.2 IRRITABLE BOWEL SYNDROME WITH BOTH CONSTIPATION AND DIARRHEA: Primary | ICD-10-CM

## 2022-07-12 DIAGNOSIS — K63.89 SMALL INTESTINAL BACTERIAL OVERGROWTH (SIBO): ICD-10-CM

## 2022-07-12 PROCEDURE — 99214 OFFICE O/P EST MOD 30 MIN: CPT | Performed by: INTERNAL MEDICINE

## 2022-07-12 NOTE — LETTER
Reason for visit: This is a 22 month follow-up appt after wide excision of phyllodes tumor of . No recent imaging.    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  The patient reports that she is doing well. She notes no recurrent mass of the right breast or other new findings     The patient originally presented with a palpable newly diagnosed large fibroepithelial lesion, of the right breast (fibroadenoma vs phyllodes).  The patient denies any palpable masses, skin changes, nipple discharge, or breast pain.       Any Previous Biopsies No     GYN History:       1st birth at age 32  Breast feeding: No   Menarche: at age 13  LMP: Regular  OCP Use: No  HRT Use: No     Family History of Malignancy: No     Any Previous Biopsies No    PERTINENT BREAST IMAGING:  I personally reviewed the relevant images for this patient and concur with the radiologist's impression.    Imaging Work Up:   BILATERAL DIGITAL DIAGNOSTIC MAMMOGRAM 3D/2D WITH CAD: 7/3/2019  CLINICAL HISTORY:Symptomatic palpable. Baseline. Symptomatic palpable.    COMPARISON:   No prior exams were available for comparison.    FINDINGS:  The tissue of both breasts is heterogeneously dense. This may lower the sensitivity of mammography.    There are benign calcifications in the left breast.    There is a 5.5 cm oval high density mass with a macrolobulated margin in the right breast at 11 o'clock middle depth 9.5 cm from the nipple.    There also is a 7 mm oval equal density nodule with a circumscribed margin in the right breast at 1 o'clock middle depth 8.5 cm from the nipple.    No other significant masses, calcifications, or other findings are seen in either breast.    Current study was also evaluated with a Computer Aided Detection (CAD) system. Digital Breast Tomosynthesis (DBT) images were obtained and used to assist in the interpretation of this examination.  July 12, 2022     Socorro Medel, [address]  [address] [id number]    Patient: Obinna Wagner   YOB: 1985   Date of Visit: 7/12/2022       Dear Dr Meño Galvin: Thank you for referring Alisha Andshakeel to me for evaluation  Below are my notes for this consultation  If you have questions, please do not hesitate to call me  I look forward to following your patient along with you  Sincerely,        Luna Stephens MD        CC: No Recipients  Luna Stephens MD  7/12/2022  2:02 PM  Sign when Signing Visit  [facility]  Progress Note - Obinna Wagner 31 y o  adult MRN: [id number]    Unit/Bed#:  Encounter: [id number]    Assessment/Plan:    1  Alternating diarrhea with constipation and mild abdominal discomfort- tested  Positive for SIBO, no improvement with Xifaxan  Xifaxan however to worsen the symptoms with worsening loose stools  She is back to her baseline  I suspect there is a functional component as well  Recommended strict adherence to low FODMAP diet, recommend seeing a nutritionist for this  start on a daily fiber supplement 1 tbsp  If no improvement with this, can consider ib Efrain Pyle  Probiotics were not helpful  2   Follow-up in 3-4 months  Subjective:     53-year-old adult with history of acne, interstitial cystitis, hypothyroidism, IBS, on nortriptyline 10 mg, recent diagnosis of small intestinal bacterial overgrowth presents for follow-up  She was prescribed Xifaxan for 2 weeks for small intestinal bacterial overgrowth  Patient reports that she developed loose stools as a result of starting Xifaxan  She completed a course of Xifaxan and has since  Gone back to her baseline  Patient reports that she has alternating days of diarrhea associated constipation  Denies small discomfort that is typically relieved with passage of stool  She stopped taking nortriptyline almost 4 weeks ago  No worsening of her symptoms    She has trialed low FODMAP diet however has had difficulty following a and is enquiring about consultation with nutrition  No alarm symptoms including hematochezia  Patient is planning on going to [facility] in several months  Objective:     Vitals: Blood pressure 106/64, pulse 74, height 5' 2" (1 575 m), weight 59 5 kg (131 lb 3 2 oz), SpO2 97 %  ,Body mass index is 24 kg/m²  [unfilled]    Physical Exam:    GEN: wn/wd, NAD  HEENT: MMM, no cervical or supraclavicular LAD, anciteric  CV: RRR, no m/r/g  CHEST: CTA b/l, no w/r/r  ABD: +BS, soft, NT/ND, no hepatosplenomegaly  EXT: no c/c/e  SKIN: no rashes  NEURO: aaox3      Invasive Devices  Report    None                         Lab, Imaging and other studies:     No visits with results within 1 Day(s) from this visit  Latest known visit with results is:   Appointment on 06/14/2022   Component Date Value    Sed Rate 06/14/2022 10     CRP 06/14/2022 <3 0          I have personally reviewed pertinent films in PACS    No current facility-administered medications for this visit     IMPRESSION: INCOMPLETE NEED ADDITIONAL IMAGING EVALUATION  The 5.5 cm oval high density mass in the right breast at 11 o'clock middle depth is indeterminate.  An ultrasound is recommended.    The 7 mm oval equal density nodule in the right breast at 1 o'clock middle depth is indeterminate.  An ultrasound is recommended.       COMPLETE ULTRASOUND OF BOTH BREASTS AND AXILLA WITH ELASTOGRAPHY: 7/3/2019  COMPARISON:  No prior exams were available for comparison.    FINDINGS:  Color flow and real-time ultrasound of both breasts four quadrants, retroareolar, and axilla regions were performed.  All representative images including gray scale of the real time examination were reviewed.     The breast tissue of both breasts is heterogeneous.    There is a 5.5 cm lobulated mass with an indistinct margin in the right breast at 9 o'clock middle depth 8 cm from the nipple.  This lobulated mass is hypoechoic with posterior acoustic shadowing and enhancement.  This correlates as palpated and with mammography findings.  Color flow imaging demonstrates that there is vascularity present.    There also is a 4 mm round cyst in the right breast at 4 o'clock middle depth 3 cm from the nipple.  This round cyst is anechoic with posterior acoustic enhancement.  Color flow imaging demonstrates that there is no vascularity present.  Elastography imaging assessment is hard.    Additionally, there is a 4 mm oval nodule with a circumscribed margin in the right breast at 4 o'clock middle depth 4 cm from the nipple.  This oval nodule is hypoechoic with posterior acoustic shadowing and enhancement.  Color flow imaging demonstrates that there is no vascularity present.    No significant abnormalities were seen sonographically in the left breast or either axilla.   IMPRESSION: SUSPICIOUS ABNORMALITY - MODERATE CONCERN BUT NOT CLASSIC FOR MALIGNANCY   Large mass in the right breast at 9 o'clock middle depth is at a high suspicion for malignancy.  An  ultrasound guided biopsy is recommended.    Tiny round cyst and oval nodule in the right breast at 4:00, suspicious of malignancy.  Ultrasound-guided biopsy is recommended.  The referring physician was notified of the findings and recommendations via [employer] secure message.    The results were reviewed with the patient.    OVERALL STUDY BIRADS: 4c High suspicion of malignancy       PERTINENT PATHOLOGY:  I personally reviewed the relevant pathology for this patient.    Final Pathological Diagnosis 7-9-19  Breast, right, ultrasound-guided ATEC biopsy       -- Fibroepithelial lesion (see comment)     Comment: Sections show a fibroepithelial lesion with patchy  leaf-like architecture and slightly increased cellular  myxoid stroma.  No cytologic atypia or increased mitoses are  seen.  The differential diagnosis includes fibroadenoma vs.  phyllodes tumor.  Complete excision is suggested for  definitive classification.  Clinical correlation is suggested.     Final Pathological Diagnosis, Phyllodes excision, 8-1-19:  1. Excision, \"right breast phyllodes tumor\": Breast tissue  with      --Phyllodes tumor with borderline malignant potential  (borderline phyllodes tumor, 5.5 cm)      --Focal lobular carcinoma in situ.      --Resection margins, no evidence of phyllodes tumor.      --See comment.  2. Excision, \"additional superficial-superior margins\":      --Breast tissue, no evidence of tumor.     Comment (\"right breast phyllodes tumor\"): Sections show a  fibroepithelial lesion with leaf-like structures and  increased stromal cellularity in many areas. Majority of the  tumor mass is circumscribed with pushing border. However,  there are microscopic areas of infiltrating borders. Some  spindle cells in the cellular periductal areas are  pleomorphic and have cytologic atypia. Increased mitosis is  also seen in the cellular stromal areas (5-9 mitosis per 10  hpfs in many areas).  The findings are consistent with phyllodes  tumor with  borderline malignant potential (borderline phyllodes tumor,  WHO classification-2012). The tumor is about 2 mm from deep  and lateral margin, 3 to 4 mm from anterior and superior  margin, 6 to 7 mm from inferior and medial margin of  specimen #1. In addition, focal lobular carcinoma in situ is  also noted. Additional superficial-superior margins (specimen #2) are  negative for phyllodes tumor.          Pathology Consultation Report (Second opinion review), [facility], 19:     Submitting Physician: Pattie MORRIS Ma     Date Specimen Collected: 19             Accession #:  [id number]     Date Specimen Received:  19                 Date Reported:           2019 17:48      Location:  [facility]       Procedure date 2019:     A: Breast, \"right breast phyllodes tumor\", lumpectomy:     -Benign phyllodes tumor (5.5 cm in greatest dimension) with focal myxoid     change, extensive lipomatous metaplasia, focal infarct, and occasional bizarre     cells; margins free     -Remaining fibrotic breast tissue with single focus of atypical lobular     hyperplasia      B: Breast, additional right superficialsuperior margin, excision:     -Breast tissue with no pathologic change     Comment: This case was subjected to intradepartmental staff review.   Name: AJ MORRISON                  MRN:         /Age:1984 (Age: 34)            Visit#:       Sex:F                         Pathology Consultation Report         Client: [facility]                         Submitting Physician: Pattie MORRIS Ma         Date Specimen Collected: 19             Accession #:  [id number]     Date Specimen Received:  19                 Date Reported:           2019 17:48      Location:  [facility]         ______________________________________________________________________________     Pathologic Diagnosis :         Procedure date 2019:         A: Breast,  \"right breast phyllodes tumor\", lumpectomy:     -Benign phyllodes tumor (5.5 cm in greatest dimension) with focal myxoid     change, extensive lipomatous metaplasia, focal infarct, and occasional bizarre     cells; margins free     -Remaining fibrotic breast tissue with single focus of atypical lobular     hyperplasia     B: Breast, additional right superficialsuperior margin, excision:     -Breast tissue with no pathologic change    Comment: This case was subjected to intradepartmental staff review.     Torres Lopez M.D.      Family History   Problem Relation Age of Onset   • Hypertension Mother    • Patient is unaware of any medical problems Father         not in contact with pt   • Hypertension Maternal Grandmother    • Diabetes Maternal Grandfather    • Cancer Maternal Grandfather         liver   • Dementia/Alzheimers Maternal Great-Grandmother    • Coronary Artery Disease Neg Hx    • Psychiatric Neg Hx    • Substance Abuse Neg Hx        Past Medical History:   Diagnosis Date   • Breast mass 2019    RIGHT side 2019 pathology results from breast biopsy is inconclusive, possible fibroadenoma versus phyllodes tumor and complete excision is being recommended 7/3/2019 mammogram with ultrasound: Dense breast tissue, benign calcifications left breast, 2 masses in the right breast and ultrasound that are suspicious for malignancy and is being recommended ultrasound-guided biopsies   • Class 2 obesity due to excess calories without serious comorbidity with body mass index (BMI) of 39.0 to 39.9 in adult 2019    Healthy BMI range between 18-25   • Multiparity          Past Surgical History:   Procedure Laterality Date   •  section, low transverse       Current Outpatient Medications   Medication Sig Dispense Refill   • traMADol (ULTRAM) 50 MG tablet   0     No current facility-administered medications for this visit.     ALLERGIES:  No Known Allergies    Social History     Tobacco Use   •  Smoking status: Never Smoker   • Smokeless tobacco: Never Used   Substance Use Topics   • Alcohol use: Never   • Drug use: Never     Family History   Problem Relation Age of Onset   • Hypertension Mother    • Patient is unaware of any medical problems Father         not in contact with pt   • Hypertension Maternal Grandmother    • Diabetes Maternal Grandfather    • Cancer Maternal Grandfather         liver   • Dementia/Alzheimers Maternal Great-Grandmother    • Coronary Artery Disease Neg Hx    • Psychiatric Neg Hx    • Substance Abuse Neg Hx      Review of Symptoms:   General: No n/f/v/c, no recent weight loss  Eyes:  No recent vision changes  ENT: No new epistaxis  Cardiovascular:  No chest pain or palpitations   Respiratory:  No new onset shortness of breath   Gastrointestinal:  No new diarrhea or constipation, no new abdominal pain  Genitourinary:  No dysuria  Musculoskeletal:  No new injuries  Neurologic:  No new memory loss  Endocrine: No new hot or cold intolerance    There were no vitals taken for this visit.  PHYSICAL EXAM:  General: NAD  Head: Atraumatic  Eyes: No scleral icterus  Mouth: Moist mucous membranes  CV: Regular rate and rhythm  Respiratory: Normal respiratory effort  Lymph: No supraclavicular, posterior/anterior cervical LAD  Breast: Symmetric Breasts          Right breast: Incision of the right lateral breast is healed nicely with good cosmesis after intermal tissue rearrangment. No palpable mass          Left breast: No dominant masses, ND, or skin changes.           Axilla: No axillary lymphadenopathy bilaterally.   Focused Ultrasound: No recurrent mass  Abdomen: Soft, non tender  Genitourinary: No flank tenderness  Musculoskeletal: No significant calf swelling  Neurologic: No gross deficits    IMPRESSION:     This 35 year old year old patient s/p wide local excision of a phyllodes tumor designated as borderline for focal area of increased mitosis in 7/19 but designated benign with a single  focus of ALH by secondary review at [facility].  She is well healed 22 mos later without evidence of recurrence. We discussed the pathology at length including natural history   We took wide margins >1cm but some margins were reported as 0.2 cm.  All margins are clear, however.  The patient is due now for new baseline imaging with mammogram and US. If negative, she may resume annual exam and q2 years imaging     I answered all questions and the patient verbalized understanding the content of our discussion. I acknowledge discussing the current standard of care and surgical treatment options according to the NCCN guidelines.      I spent 25 minutes in the evaluation of this patient.  > 50% of which was counseling and/or coordination of care.

## 2022-07-12 NOTE — PROGRESS NOTES
[facility]  Progress Note - Shalini Abel 31 y o  adult MRN: [id number]    Unit/Bed#:  Encounter: [id number]    Assessment/Plan:    1  Alternating diarrhea with constipation and mild abdominal discomfort- tested  Positive for SIBO, no improvement with Xifaxan  Xifaxan however to worsen the symptoms with worsening loose stools  She is back to her baseline  I suspect there is a functional component as well  Recommended strict adherence to low FODMAP diet, recommend seeing a nutritionist for this  start on a daily fiber supplement 1 tbsp  If no improvement with this, can consider ib Jennaren Levy  Probiotics were not helpful  2   Follow-up in 3-4 months  Subjective:     53-year-old adult with history of acne, interstitial cystitis, hypothyroidism, IBS, on nortriptyline 10 mg, recent diagnosis of small intestinal bacterial overgrowth presents for follow-up  She was prescribed Xifaxan for 2 weeks for small intestinal bacterial overgrowth  Patient reports that she developed loose stools as a result of starting Xifaxan  She completed a course of Xifaxan and has since  Gone back to her baseline  Patient reports that she has alternating days of diarrhea associated constipation  Denies small discomfort that is typically relieved with passage of stool  She stopped taking nortriptyline almost 4 weeks ago  No worsening of her symptoms  She has trialed low FODMAP diet however has had difficulty following a and is enquiring about consultation with nutrition  No alarm symptoms including hematochezia  Patient is planning on going to [facility] in several months  Objective:     Vitals: Blood pressure 106/64, pulse 74, height 5' 2" (1 575 m), weight 59 5 kg (131 lb 3 2 oz), SpO2 97 %  ,Body mass index is 24 kg/m²      [unfilled]    Physical Exam:    GEN: wn/wd, NAD  HEENT: MMM, no cervical or supraclavicular LAD, anciteric  CV: RRR, no m/r/g  CHEST: CTA b/l, no w/r/r  ABD: +BS, soft, NT/ND, no hepatosplenomegaly  EXT: no c/c/e  SKIN: no rashes  NEURO: aaox3      Invasive Devices  Report    None                         Lab, Imaging and other studies:     No visits with results within 1 Day(s) from this visit  Latest known visit with results is:   Appointment on 06/14/2022   Component Date Value    Sed Rate 06/14/2022 10     CRP 06/14/2022 <3 0          I have personally reviewed pertinent films in PACS    No current facility-administered medications for this visit

## 2022-07-13 ENCOUNTER — NURSE TRIAGE (OUTPATIENT)
Dept: OTHER | Facility: OTHER | Age: 37
End: 2022-07-13

## 2022-07-13 DIAGNOSIS — B34.9 VIRAL INFECTION, UNSPECIFIED: Primary | ICD-10-CM

## 2022-07-13 NOTE — TELEPHONE ENCOUNTER
Patient called in to report symptoms of Covid post return from [facility] on 7/9/22 late evening and started noticing the symptoms 7/11/22 and since Monday, patient is experiencing more symptoms  Patient has taken 3 home tests; last on at 12:00 today; all results are negative  Patient reports increasing fatigue with SOB with stairs  Care advice offered for symptoms but referring to PCP for possible PCR test  VV, and if appropriate, patient inquired about oral antiviral medication  Please follow up with patient  Reason for Disposition   MILD difficulty breathing (e g , minimal/no SOB at rest, SOB with walking, pulse <100)    Answer Assessment - Initial Assessment Questions  Were you within 6 feet or less, for up to 15 minutes or more with a person that has a confirmed COVID-19 test? Recent return from trip to [facility]    What was the date of your exposure? Home tests x 3 wih negative results; last test 7/13 at 12:00 PM    Are you experiencing any symptoms attributed to the virus?  (Assess for SOB, cough, fever, difficulty breathing) sore throat, body aches, headaches, nasal congestion, fatigue, cough, runny nose, chills, SOB with stairs    HIGH RISK: Do you have any history heart or lung conditions, weakened immune system, diabetes, Asthma, CHF, HIV, COPD, Chemo, renal failure, sickle cell, etc? Denies    PREGNANCY: Are you pregnant or did you recently give birth?  Denies    VACCINE: "Have you gotten the COVID-19 vaccine?" If Yes ask: "Which one, how many shots, when did you get it?" Yes, J&J plus 2 Moderna booster (last booster mid-May)    Protocols used: CORONAVIRUS (COVID-19) DIAGNOSED OR SUSPECTED-ADULT-[place]

## 2022-07-13 NOTE — TELEPHONE ENCOUNTER
Regarding: [facility]-Covid symptoms  ----- Message from Kathrin Rodriguez RN sent at 7/13/2022  1:09 PM EDT -----  Recent return from [facility]  She reports sore throat, fatigue, body aches, nasal congestion, and headache  Two at home tests negative but looking for advice

## 2022-07-14 ENCOUNTER — CLINICAL SUPPORT (OUTPATIENT)
Dept: INTERNAL MEDICINE CLINIC | Facility: CLINIC | Age: 37
End: 2022-07-14

## 2022-07-14 DIAGNOSIS — Z11.52 ENCOUNTER FOR SCREENING FOR COVID-19: Primary | ICD-10-CM

## 2022-07-14 PROCEDURE — U0003 INFECTIOUS AGENT DETECTION BY NUCLEIC ACID (DNA OR RNA); SEVERE ACUTE RESPIRATORY SYNDROME CORONAVIRUS 2 (SARS-COV-2) (CORONAVIRUS DISEASE [COVID-19]), AMPLIFIED PROBE TECHNIQUE, MAKING USE OF HIGH THROUGHPUT TECHNOLOGIES AS DESCRIBED BY CMS-2020-01-R: HCPCS | Performed by: INTERNAL MEDICINE

## 2022-07-14 PROCEDURE — U0005 INFEC AGEN DETEC AMPLI PROBE: HCPCS | Performed by: INTERNAL MEDICINE

## 2022-07-15 LAB — SARS-COV-2 RNA RESP QL NAA+PROBE: POSITIVE

## 2022-08-15 ENCOUNTER — CONSULT (OUTPATIENT)
Dept: ENDOCRINOLOGY | Facility: CLINIC | Age: 37
End: 2022-08-15
Payer: COMMERCIAL

## 2022-08-15 VITALS
BODY MASS INDEX: 24.73 KG/M2 | SYSTOLIC BLOOD PRESSURE: 110 MMHG | DIASTOLIC BLOOD PRESSURE: 70 MMHG | WEIGHT: 134.4 LBS | HEART RATE: 85 BPM | HEIGHT: 62 IN

## 2022-08-15 DIAGNOSIS — E03.9 ACQUIRED HYPOTHYROIDISM: ICD-10-CM

## 2022-08-15 PROCEDURE — 99203 OFFICE O/P NEW LOW 30 MIN: CPT | Performed by: INTERNAL MEDICINE

## 2022-08-15 NOTE — PROGRESS NOTES
Consultation - Meño Hightower 31 y o  adult MRN: [id number]    Unit/Bed#:  Encounter: [id number]      Assessment/Plan     Assessment: This is a 31y o -year-old adult consult for hypothyroidism and referred by PCP  She was previously on nortriptyline for IBS D and had severe constipation which is currently resolved upon discontinuation of the medication and starting fiber supplements, she was recently diagnosed with SIBO and patient wishes to know if her SIBO is related to her hypothyroidism  Most recent TSH 1 570 is normal and has been normal for several years with highest it has been 3 42 in 2018  - Patient does not currently have symptoms of hypothyroidism also has normal TSH  Plan:  - will continue current dose of levothyroxine 88 mcg daily  - will recommend repeat TSH in 6 months  - patient will prefer to continue follow-up with PCP       Consults    CC:  Hypothyroidism    History of Present Illness     HPI: Meño Hightower is a 31y o  year old adult presents as a consult for hypothyroidism un referred by her PCP  She has a past medical history of IBS-d, hypothyroidism, migraine headaches  She is currently managed/ follows up with GI (recently diagnosed with SIBO) and wishes to know if her SIBO is related to her hypothyroidism  Per patient, she was previously taking nortriptyline for IBS-d but developed severe constipation and was taking off the medication  She admits to improvement in her symptom after stopping the medication and also taking fiber supplement as recommended by GI  Patient is currently on levothyroxine 88 mcg daily, reports compliance with the medication, has been taking levothyroxine since age 23  She has been experiencing shorter menstrual cycles, she has periods every 26 days as opposed to 28 days since the past 2 years, she has experienced weight gain over the past 2 years from baseline of 125-134 lb    She reports dry skin(chronic, and she is on Retin-A and has keratosis pilaris), pt  admits to slight fatigue since COVID(6 weeks ago), reports fatigue is improving, she denies tightness/discomfort in her throat, denies previous head and neck radiation, denies previous surgery to the neck, denies difficulty concentrating/memory loss  She has a family history of goiter in her great aunt, denies use of cigarettes and reports 4-5 drinks per week, rarely smokes marijuana, patient works as a technology researcher      Review of Systems 10 point review of system unremarkable except that listed in my HPI above    Historical Information   Past Medical History:   Diagnosis Date    Migraine with aura and without status migrainosus, not intractable     Ovarian cyst      Past Surgical History:   Procedure Laterality Date    COLONOSCOPY      FOOT SURGERY Left 2020    cysts    MANDIBLE SURGERY  2006    REPAIR LABRUM Left 2017     Social History   Social History     Substance and Sexual Activity   Alcohol Use Yes    Comment: social     Social History     Substance and Sexual Activity   Drug Use Yes    Types: Marijuana    Comment: very rarely gummie       Social History     Tobacco Use   Smoking Status Never Smoker   Smokeless Tobacco Never Used     Family History:   Family History   Problem Relation Age of Onset    No Known Problems Mother     No Known Problems Father     Skin cancer Paternal Grandmother     Lung cancer Paternal Grandmother     Coronary artery disease Paternal Grandmother     Multiple myeloma Maternal Aunt     Colon cancer Maternal Uncle     Coronary artery disease Maternal Grandmother     No Known Problems Sister     Celiac disease Family        Meds/Allergies   Current Outpatient Medications   Medication Sig Dispense Refill    levothyroxine (Euthyrox) 88 mcg tablet Take 1 tablet (88 mcg total) by mouth daily      Melatonin Gummies 2 5 MG CHEW Chew If needed      Phexxi 1 8-1-0 4 % GEL INSERT 1 APPLICATOR INTO THE VAGINA DAILY IMMEDIATELY PRIOR TO INTERCOURSE  Soolantra 1 % CREA       spironolactone (ALDACTONE) 50 mg tablet Take 1 tablet (50 mg total) by mouth 2 (two) times a day 180 tablet 1    tretinoin (RETIN-A) 0 025 % cream Apply topically in the morning      nortriptyline (PAMELOR) 10 mg capsule Take by mouth daily at bedtime Causes constipation only takes if needed  (Patient not taking: No sig reported)      ondansetron (ZOFRAN-ODT) 4 mg disintegrating tablet Take 1 tablet (4 mg total) by mouth every 6 (six) hours as needed for nausea or vomiting (Patient not taking: No sig reported) 10 tablet 0    SUMAtriptan (IMITREX) 100 mg tablet TAKE 1 TABLET (100 MG TOTAL) BY MOUTH ONCE AS NEEDED FOR MIGRAINE FOR UP TO 1 DOSE (Patient not taking: No sig reported) 10 tablet 0     No current facility-administered medications for this visit  No Known Allergies    Objective   Vitals: Blood pressure 110/70, pulse 85, height 5' 2" (1 575 m), weight 61 kg (134 lb 6 4 oz)  [unfilled]  Invasive Devices  Report    None                 Physical Exam  Vitals reviewed  Constitutional:       Appearance: Normal appearance  HENT:      Head: Normocephalic and atraumatic  Mouth/Throat:      Mouth: Mucous membranes are moist       Pharynx: Oropharynx is clear  Eyes:      General: No scleral icterus  Extraocular Movements: Extraocular movements intact  Cardiovascular:      Rate and Rhythm: Normal rate and regular rhythm  Pulses: Normal pulses  Heart sounds: Normal heart sounds  No murmur heard  Pulmonary:      Effort: Pulmonary effort is normal       Breath sounds: Normal breath sounds  Abdominal:      General: Bowel sounds are normal       Palpations: Abdomen is soft  Musculoskeletal:         General: No swelling or tenderness  Normal range of motion  Cervical back: Normal range of motion and neck supple  Skin:     General: Skin is warm and dry  Capillary Refill: Capillary refill takes less than 2 seconds     Neurological:      General: No focal deficit present  Mental Status: Roslyn Nelson is alert and oriented to person, place, and time  Psychiatric:         Mood and Affect: Mood normal          The history was obtained from the review of the chart, patient  Lab Results:       Lab Results   Component Value Date    WBC 7 91 04/28/2022    HGB 14 5 04/28/2022    HCT 44 9 04/28/2022    MCV 94 04/28/2022     04/28/2022     Lab Results   Component Value Date/Time    BUN 13 04/28/2022 10:59 AM    K 4 3 04/28/2022 10:59 AM     04/28/2022 10:59 AM    CO2 28 04/28/2022 10:59 AM    CREATININE 1 08 04/28/2022 10:59 AM    AST 15 04/28/2022 10:59 AM    ALT 19 04/28/2022 10:59 AM    ALB 4 1 04/28/2022 10:59 AM     No results for input(s): CHOL, HDL, LDL, TRIG, VLDL in the last 72 hours  No results found for: [place] Row  No results found for: POCGLU    Imaging Studies: I have personally reviewed pertinent reports  Portions of the record may have been created with voice recognition software

## 2022-08-19 ENCOUNTER — TELEPHONE (OUTPATIENT)
Dept: DERMATOLOGY | Facility: CLINIC | Age: 37
End: 2022-08-19

## 2022-08-19 NOTE — TELEPHONE ENCOUNTER
Pt called to schedule IPL treatments, cosmetic beginning October, can only schedule 2 as 2023 schedule is not open yet  Informed pt of cost $300 per tx or $900 for 3 tx (buy 3 get 1 free) =4 tx    Informed pt of the above  Verbal agreement  Estimate completed

## 2022-09-06 ENCOUNTER — OFFICE VISIT (OUTPATIENT)
Dept: DERMATOLOGY | Facility: CLINIC | Age: 37
End: 2022-09-06
Payer: COMMERCIAL

## 2022-09-06 VITALS — BODY MASS INDEX: 24.66 KG/M2 | WEIGHT: 134 LBS | HEIGHT: 62 IN | TEMPERATURE: 97.1 F

## 2022-09-06 DIAGNOSIS — D23.9 DERMATOFIBROMA: ICD-10-CM

## 2022-09-06 DIAGNOSIS — L85.8 KERATOSIS PILARIS: ICD-10-CM

## 2022-09-06 DIAGNOSIS — D22.9 MULTIPLE MELANOCYTIC NEVI: Primary | ICD-10-CM

## 2022-09-06 DIAGNOSIS — D18.01 CHERRY ANGIOMA: ICD-10-CM

## 2022-09-06 PROCEDURE — 99213 OFFICE O/P EST LOW 20 MIN: CPT | Performed by: DERMATOLOGY

## 2022-09-06 NOTE — PROGRESS NOTES
[facility] Follow Up Note    Patient Name: Wali Tsai  Encounter Date: 9/6/2022    Today's Chief Concerns:  [employer] Concern #1:  Follow acne/ rosacea    Concern #2:    [employer] Concern #3:      Current Medications:    Current Outpatient Medications:     levothyroxine (Euthyrox) 88 mcg tablet, Take 1 tablet (88 mcg total) by mouth daily, Disp: 90 tablet, Rfl: 3    Melatonin Gummies 2 5 MG CHEW, Chew If needed, Disp: , Rfl:     Phexxi 1 8-1-0 4 % GEL, INSERT 1 APPLICATOR INTO THE VAGINA DAILY IMMEDIATELY PRIOR TO INTERCOURSE, Disp: , Rfl:     Soolantra 1 % CREA, , Disp: , Rfl:     spironolactone (ALDACTONE) 50 mg tablet, Take 1 tablet (50 mg total) by mouth 2 (two) times a day, Disp: 180 tablet, Rfl: 1    tretinoin (RETIN-A) 0 025 % cream, Apply topically in the morning, Disp: , Rfl:     nortriptyline (PAMELOR) 10 mg capsule, Take by mouth daily at bedtime Causes constipation only takes if needed  (Patient not taking: No sig reported), Disp: , Rfl:     ondansetron (ZOFRAN-ODT) 4 mg disintegrating tablet, Take 1 tablet (4 mg total) by mouth every 6 (six) hours as needed for nausea or vomiting (Patient not taking: No sig reported), Disp: 10 tablet, Rfl: 0    SUMAtriptan (IMITREX) 100 mg tablet, TAKE 1 TABLET (100 MG TOTAL) BY MOUTH ONCE AS NEEDED FOR MIGRAINE FOR UP TO 1 DOSE (Patient not taking: No sig reported), Disp: 10 tablet, Rfl: 0    CONSTITUTIONAL:   Vitals:    09/06/22 1327   Temp: (!) 97 1 °F (36 2 °C)   Weight: 60 8 kg (134 lb)   Height: 5' 2" (1 575 m)         Specific Alerts:    Have you been seen by a [facility] Dermatologist in the last 3 years? YES    Are you pregnant or planning to become pregnant? No    Are you currently or planning to be nursing or breast feeding? No    No Known Allergies    May we call your Preferred Phone number to discuss your specific medical information? YES    May we leave a detailed message that includes your specific medical information?  YES    Have you traveled outside of the [address] [place] [address] in the past 3 months? No    Do you currently have a pacemaker or defibrillator? No    Do you have any artificial heart valves, joints, plates, screws, rods, stents, pins, etc? No   - If Yes, were any placed within the last 2 years? Do you require any medications prior to a surgical procedure? No   - If Yes, for which procedure? - If Yes, what medications to you require? Are you taking any medications that cause you to bleed more easily ("blood thinners") No    Have you ever experienced a rapid heartbeat with epinephrine? No      Review of Systems:  Have you recently had or currently have any of the following?     · Fever or chills: No  · Night Sweats: No  · Headaches: No  · Weight Gain: No  · Weight Loss: No  · Blurry Vision: No  · Nausea: No  · Vomiting: No  · Diarrhea: No  · Blood in Stool: No  · Abdominal Pain: No  · Itchy Skin: No  · Painful Joints: No  · Swollen Joints: No  · Muscle Pain: No  · Irregular Mole: YES  · Sun Burn: No  · Dry Skin: No  · Skin Color Changes: No  · Scar or Keloid: No  · Cold Sores/Fever Blisters: No  · Bacterial Infections/MRSA: No  · Anxiety: YES  · Depression: No  · Suicidal or Homicidal Thoughts: No      PSYCH: Normal mood and affect  EYES: Normal conjunctiva  ENT: Normal lips and oral mucosa  CARDIOVASCULAR: No edema  RESPIRATORY: Normal respirations  HEME/LYMPH/IMMUNO:  No regional lymphadenopathy except as noted below in ASSESSMENT AND PLAN BY DIAGNOSIS    FULL ORGAN SYSTEM SKIN EXAM (SKIN)   Hair, Scalp, Ears, Face Normal except as noted below in Assessment   Neck, Cervical Chain Nodes Normal except as noted below in Assessment   Right Arm/Hand/Fingers Normal except as noted below in Assessment   Left Arm/Hand/Fingers Normal except as noted below in Assessment   Chest/Breasts/Axillae Viewed areas Normal except as noted below in Assessment   Abdomen, Umbilicus Normal except as noted below in Assessment   Back/Spine Normal except as noted below in Assessment   Groin/Genitalia/Buttocks Viewed areas Normal except as noted below in Assessment   Right Leg, Foot, Toes Normal except as noted below in Assessment   Left Leg, Foot, Toes Normal except as noted below in Assessment       MELANOCYTIC NEVI ("Moles")    Physical Exam:   Anatomic Location Affected:   Mostly on sun-exposed areas of the trunk and extremities   Morphological Description:  Scattered, 1-4mm round to ovoid, symmetrical-appearing, even bordered, skin colored to dark brown macules/papules, mostly in sun-exposed areas   Pertinent Positives:   Pertinent Negatives: Additional History of Present Condition:      Assessment and Plan:  Based on a thorough discussion of this condition and the management approach to it (including a comprehensive discussion of the known risks, side effects and potential benefits of treatment), the patient (family) agrees to implement the following specific plan:   When outside we recommend using a wide brim hat, sunglasses, long sleeve and pants, sunscreen with SPF 43+ with reapplication every 2 hours, or SPF specific clothing    Benign, reassured   Annual skin check     Melanocytic Nevi  Melanocytic nevi ("moles") are tan or brown, raised or flat areas of the skin which have an increased number of melanocytes  Melanocytes are the cells in our body which make pigment and account for skin color  Some moles are present at birth (I e , "congenital nevi"), while others come up later in life (i e , "acquired nevi")  The sun can stimulate the body to make more moles  Sunburns are not the only thing that triggers more moles  Chronic sun exposure can do it too  Clinically distinguishing a healthy mole from melanoma may be difficult, even for experienced dermatologists  The "ABCDE's" of moles have been suggested as a means of helping to alert a person to a suspicious mole and the possible increased risk of melanoma    The suggestions for raising alert are as follows:    Asymmetry: Healthy moles tend to be symmetric, while melanomas are often asymmetric  Asymmetry means if you draw a line through the mole, the two halves do not match in color, size, shape, or surface texture  Asymmetry can be a result of rapid enlargement of a mole, the development of a raised area on a previously flat lesion, scaling, ulceration, bleeding or scabbing within the mole  Any mole that starts to demonstrate "asymmetry" should be examined promptly by a board certified dermatologist      Border: Healthy moles tend to have discrete, even borders  The border of a melanoma often blends into the normal skin and does not sharply delineate the mole from normal skin  Any mole that starts to demonstrate "uneven borders" should be examined promptly by a board certified dermatologist      Color: Healthy moles tend to be one color throughout  Melanomas tend to be made up of different colors ranging from dark black, blue, white, or red  Any mole that demonstrates a color change should be examined promptly by a board certified dermatologist      Diameter: Healthy moles tend to be smaller than 0 6 cm in size; an exception are "congenital nevi" that can be larger  Melanomas tend to grow and can often be greater than 0 6 cm (1/4 of an inch, or the size of a pencil eraser)  This is only a guideline, and many normal moles may be larger than 0 6 cm without being unhealthy  Any mole that starts to change in size (small to bigger or bigger to smaller) should be examined promptly by a board certified dermatologist      Evolving: Healthy moles tend to "stay the same "  Melanomas may often show signs of change or evolution such as a change in size, shape, color, or elevation    Any mole that starts to itch, bleed, crust, burn, hurt, or ulcerate or demonstrate a change or evolution should be examined promptly by a board certified dermatologist         Paul Moses Exam:   Anatomic Location Affected:  trunk   Morphological Description:  Scattered cherry red, 1-4 mm papules   Pertinent Positives:   Pertinent Negatives: Additional History of Present Condition:      Assessment and Plan:  Based on a thorough discussion of this condition and the management approach to it (including a comprehensive discussion of the known risks, side effects and potential benefits of treatment), the patient (family) agrees to implement the following specific plan:   Monitor for changes   Benign, reassured       Assessment and Plan:    Cherry angioma, also known as [employer] spots, are benign vascular skin lesions  A "cherry angioma" is a firm red, blue or purple papule, 0 1-1 cm in diameter  When thrombosed, they can appear black in colour until evaluated with a dermatoscope when the red or purple colour is more easily seen  Cherry angioma may develop on any part of the body but most often appear on the scalp, face, lips and trunk  An angioma is due to proliferating endothelial cells; these are the cells that line the inside of a blood vessel  Angiomas can arise in early life or later in life; the most common type of angioma is a cherry angioma  Cherry angiomas are very common in males and females of any age or race  They are more noticeable in white skin than in skin of colour  They markedly increase in number from about the age of 36  There may be a family history of similar lesions  Eruptive cherry angiomas have been rarely reported to be associated with internal malignancy  The cause of angiomas is unknown  Genetic analysis of cherry angiomas has shown that they frequently carry specific somatic missense mutations in the GNAQ and GNA11 (Q209H) genes, which are involved in other vascular and melanocytic proliferations          XEROSIS ("DRY SKIN")    Physical Exam:   Anatomic Location Affected:  diffuse   Morphological Description:  xerosis   Pertinent Positives:   Pertinent Negatives: Additional History of Present Condition:      Assessment and Plan:  Based on a thorough discussion of this condition and the management approach to it (including a comprehensive discussion of the known risks, side effects and potential benefits of treatment), the patient (family) agrees to implement the following specific plan:   Use moisturizer like Eucerin,Cerave or Aveeno Cream 3 times a day for the dry skin           Can apply Urea to heels and wear socks at night to help cracked heels  You can find this on [employer]  Dry skin refers to skin that feels dry to touch  Dry skin has a dull surface with a rough, scaly quality  The skin is less pliable and cracked  When dryness is severe, the skin may become inflamed and fissured  Although any body site can be dry, dry skin tends to affect the shins more than any other site  Dry skin is lacking moisture in the outer horny cell layer (stratum corneum) and this results in cracks in the skin surface  Dry skin is also called xerosis, xeroderma or asteatosis (lack of fat)  It can affect males and females of all ages  There is some racial variability in water and lipid content of the skin   Dry skin that starts in early childhood may be one of about 20 types of ichthyosis (fish-scale skin)  There is often a family history of dry skin   Dry skin is commonly seen in people with atopic dermatitis   Nearly everyone > 60 years has dry skin  Dry skin that begins later may be seen in people with certain diseases and conditions   Postmenopausal women   Hypothyroidism   Chronic renal disease    Malnutrition and weight loss    Subclinical dermatitis    Treatment with certain drugs such as oral retinoids, diuretics and epidermal growth factor receptor inhibitors      What is the treatment for dry skin? The mainstay of treatment of dry skin and ichthyosis is moisturisers/emollients   They should be applied liberally and often enough to:   Reduce itch    Improve the barrier function    Prevent entry of irritants, bacteria    Reduce transepidermal water loss  How can dry skin be prevented? Eliminate aggravating factors:   Reduce the frequency of bathing   A humidifier in winter and air conditioner in summer    Compare having a short shower with a prolonged soak in a bath   Use lukewarm, not hot, water   Replace standard soap with a substitute such as a synthetic detergent cleanser, water-miscible emollient, bath oil, anti-pruritic tar oil, colloidal oatmeal etc     Apply an emollient liberally and often, particularly shortly after bathing, and when itchy  The drier the skin, the thicker this should be, especially on the hands  What is the outlook for dry skin? A tendency to dry skin may persist life-long, or it may improve once contributing factors are controlled  KERATOSIS PILARIS    Physical Exam:   Anatomic Location Affected:  Bilateral arms   Morphological Description:  1-0CR maria esther-follicular pinkish-red papules    Pertinent Positives:   Pertinent Negatives: Additional History of Present Condition:  Patient is using Amlactin  Patient reports a flare on her hands  Assessment and Plan:  Based on a thorough discussion of this condition and the management approach to it (including a comprehensive discussion of the known risks, side effects and potential benefits of treatment), the patient (family) agrees to implement the following specific plan:   Can try amlactin, cerave SA, [place] rough and bumpy  You must moisturize several times a day to help this condition as KP is chronic  Keratosis pilaris is a very common condition that appears as tiny bumps on the skin that may look like goosebumps or small pimples  These bumps are composed of small plugs of dead skin cells and are most commonly found over the upper arms and thighs  Children may also find bumps on their cheeks   Keratosis pilaris is harmless and affects up to half of normal children and up to three quarters of children who have ichthyosis vulgaris (a dry skin condition due to filaggrin gene mutations)  It is also more common in children with atopic eczema  Common symptoms of keratosis pilaris begin before age 3 or during the teenage years   Bumps over the outer aspect of upper arms and thighs symmetrically that feel like sandpaper   Potentially over buttocks, sides of cheeks, forearms, and upper back   Scaly, skin colored or red spots in keratosis pilaris rubra   Non-painful, but potential to be itchy     Keratosis pilaris is caused by abnormal growth of skin cells lining the upper portion of the hair follicles  Instead of naturally sloughing off, scaly skin will pile up and fill the follicle  There is a strong association with genetics, meaning that the condition has a high chance of being inherited if one or both parents are affected  It can also occur as a side effect of cancer therapies such as vemurafenib  Treating dry skin often helps as dry skin can cause the bumps to be more noticeable  Many people notice that the bumps disappear in the summer months when there is more moisture in the air  Sometimes, keratosis pilaris fades as one grows older, but puberty and hormonal therapy can cause flare-ups   If itch, dryness, or appearance bother you, treatment options include:   Using non-soap cleansers to minimize dryness of the skin    Exfoliation in the shower using a pumice stone or exfoliating sponge   Ammonium lactate cream or lotion (12%)    A moisturizing cream or ointment applied at least 2-3x a day and after bathing   o Creams containing urea or lactic acid are especially helpful    Other medicines that remove dead skin cells can also be effective   o Alpha hydroxyl acid  o Glycolic acid  o Retinoids (adapalene, retinol, tazarotene, trentinoin)  o Salicylic acid   Pulse dye laser treatments or intense pulsed light can reduce redness temporarily, but not roughness    Laser assisted hair removal     DERMATOFIBROMA    Physical Exam:   Anatomic Location Affected:  Left thigh   Morphological Description:  Pink tan firm papule   Pertinent Positives:   Pertinent Negatives: Additional History of Present Condition:  Found on exam    Assessment and Plan:  Based on a thorough discussion of this condition and the management approach to it (including a comprehensive discussion of the known risks, side effects and potential benefits of treatment), the patient (family) agrees to implement the following specific plan:   Benign, assurance provided     Assessment and Plan:  A dermatofibroma is a common benign fibrous nodule that most often arises on the skin of the lower legs  A dermatofibroma is also called a "cutaneous fibrous histiocytoma "  Dermatofibromas occur at all ages and in people of every ethnicity  They are more common in women than in men  It is not clear if dermatofibroma is a reactive process or if it is a neoplasm  The lesions are made up of proliferating fibroblasts  Histiocytes may also be involved  They are sometimes attributed to an insect bite or ingrownhair or local trauma, but not consistently  They may be more numerous in patients with altered immunity  Dermatofibromas most often occur on the legs and arms, but may also arise on the trunk or any site of the body  Typical clinical features include the following:   People may have 1 or up to 15 lesions   Size varies from 0 5-1 5 cm diameter; most lesions are 7-10 mm diameter   They are firm nodules tethered to the skin surface and mobile over subcutaneous tissue   The skin "dimples" on pinching the lesion   Color may be pink to light brown in white skin, and dark brown to black in dark skin; some appear paler in the center   They do not usually cause symptoms, but they are sometimes painful or itchy     Because they are often raised lesions, they may be traumatized, for example by a razor   Occasionally dozens may erupt within a few months, usually in the setting of immunosuppression (for example autoimmune disease, cancer or certain medications)   Dermatofibroma does not give rise to cancer  However, occasionally, it may be mistaken for dermatofibrosarcoma or desmoplastic melanoma  A dermatofibroma is harmless and seldom causes any symptoms  Usually, only reassurance is needed  If it is nuisance or causing concern, the lesion can be removed surgically, resulting in a scar that is, by definition, usually longer in diameter than the widest portion of the dermatofibroma  Cryotherapy, shave biopsy and laser surgery are rarely completely successful  Skin punch biopsy or incisional biopsy may be undertaken if there is an atypical feature such as recent enlargement, ulceration, or asymmetrical structures and colours on dermatoscopy                      Scribe Attestation    I,:  Amro Jackson am acting as a scribe while in the presence of the attending physician :       I,:  Devi Babb MD personally performed the services described in this documentation    as scribed in my presence :

## 2022-09-06 NOTE — PATIENT INSTRUCTIONS
MELANOCYTIC NEVI ("Moles")    Assessment and Plan:  Based on a thorough discussion of this condition and the management approach to it (including a comprehensive discussion of the known risks, side effects and potential benefits of treatment), the patient (family) agrees to implement the following specific plan:  When outside we recommend using a wide brim hat, sunglasses, long sleeve and pants, sunscreen with SPF 46+ with reapplication every 2 hours, or SPF specific clothing   Benign, reassured  Annual skin check     Melanocytic Nevi  Melanocytic nevi ("moles") are tan or brown, raised or flat areas of the skin which have an increased number of melanocytes  Melanocytes are the cells in our body which make pigment and account for skin color  Some moles are present at birth (I e , "congenital nevi"), while others come up later in life (i e , "acquired nevi")  The sun can stimulate the body to make more moles  Sunburns are not the only thing that triggers more moles  Chronic sun exposure can do it too  Clinically distinguishing a healthy mole from melanoma may be difficult, even for experienced dermatologists  The "ABCDE's" of moles have been suggested as a means of helping to alert a person to a suspicious mole and the possible increased risk of melanoma  The suggestions for raising alert are as follows:    Asymmetry: Healthy moles tend to be symmetric, while melanomas are often asymmetric  Asymmetry means if you draw a line through the mole, the two halves do not match in color, size, shape, or surface texture  Asymmetry can be a result of rapid enlargement of a mole, the development of a raised area on a previously flat lesion, scaling, ulceration, bleeding or scabbing within the mole  Any mole that starts to demonstrate "asymmetry" should be examined promptly by a board certified dermatologist      Border: Healthy moles tend to have discrete, even borders    The border of a melanoma often blends into the normal skin and does not sharply delineate the mole from normal skin  Any mole that starts to demonstrate "uneven borders" should be examined promptly by a board certified dermatologist      Color: Healthy moles tend to be one color throughout  Melanomas tend to be made up of different colors ranging from dark black, blue, white, or red  Any mole that demonstrates a color change should be examined promptly by a board certified dermatologist      Diameter: Healthy moles tend to be smaller than 0 6 cm in size; an exception are "congenital nevi" that can be larger  Melanomas tend to grow and can often be greater than 0 6 cm (1/4 of an inch, or the size of a pencil eraser)  This is only a guideline, and many normal moles may be larger than 0 6 cm without being unhealthy  Any mole that starts to change in size (small to bigger or bigger to smaller) should be examined promptly by a board certified dermatologist      Evolving: Healthy moles tend to "stay the same "  Melanomas may often show signs of change or evolution such as a change in size, shape, color, or elevation  Any mole that starts to itch, bleed, crust, burn, hurt, or ulcerate or demonstrate a change or evolution should be examined promptly by a board certified dermatologist         CAVAZOS ANGIOMAS    Assessment and Plan:  Based on a thorough discussion of this condition and the management approach to it (including a comprehensive discussion of the known risks, side effects and potential benefits of treatment), the patient (family) agrees to implement the following specific plan:  Monitor for changes  Benign, reassured      Assessment and Plan:    Cherry angioma, also known as Jean-Pierre Re spots, are benign vascular skin lesions  A "cherry angioma" is a firm red, blue or purple papule, 0 1-1 cm in diameter  When thrombosed, they can appear black in colour until evaluated with a dermatoscope when the red or purple colour is more easily seen  Cherry angioma may develop on any part of the body but most often appear on the scalp, face, lips and trunk  An angioma is due to proliferating endothelial cells; these are the cells that line the inside of a blood vessel  Angiomas can arise in early life or later in life; the most common type of angioma is a cherry angioma  Cherry angiomas are very common in males and females of any age or race  They are more noticeable in white skin than in skin of colour  They markedly increase in number from about the age of 36  There may be a family history of similar lesions  Eruptive cherry angiomas have been rarely reported to be associated with internal malignancy  The cause of angiomas is unknown  Genetic analysis of cherry angiomas has shown that they frequently carry specific somatic missense mutations in the GNAQ and GNA11 (Q209H) genes, which are involved in other vascular and melanocytic proliferations  XEROSIS ("DRY SKIN")    Assessment and Plan:  Based on a thorough discussion of this condition and the management approach to it (including a comprehensive discussion of the known risks, side effects and potential benefits of treatment), the patient (family) agrees to implement the following specific plan:  Use moisturizer like Eucerin,Cerave or Aveeno Cream 3 times a day for the dry skin        Can apply Urea to heels and wear socks at night to help cracked heels  You can find this on [employer]  Dry skin refers to skin that feels dry to touch  Dry skin has a dull surface with a rough, scaly quality  The skin is less pliable and cracked  When dryness is severe, the skin may become inflamed and fissured  Although any body site can be dry, dry skin tends to affect the shins more than any other site  Dry skin is lacking moisture in the outer horny cell layer (stratum corneum) and this results in cracks in the skin surface  Dry skin is also called xerosis, xeroderma or asteatosis (lack of fat)    It can affect males and females of all ages  There is some racial variability in water and lipid content of the skin  Dry skin that starts in early childhood may be one of about 20 types of ichthyosis (fish-scale skin)  There is often a family history of dry skin  Dry skin is commonly seen in people with atopic dermatitis  Nearly everyone > 60 years has dry skin  Dry skin that begins later may be seen in people with certain diseases and conditions  Postmenopausal women  Hypothyroidism  Chronic renal disease   Malnutrition and weight loss   Subclinical dermatitis   Treatment with certain drugs such as oral retinoids, diuretics and epidermal growth factor receptor inhibitors      What is the treatment for dry skin? The mainstay of treatment of dry skin and ichthyosis is moisturisers/emollients  They should be applied liberally and often enough to:  Reduce itch   Improve the barrier function   Prevent entry of irritants, bacteria   Reduce transepidermal water loss  How can dry skin be prevented? Eliminate aggravating factors:  Reduce the frequency of bathing  A humidifier in winter and air conditioner in summer   Compare having a short shower with a prolonged soak in a bath  Use lukewarm, not hot, water  Replace standard soap with a substitute such as a synthetic detergent cleanser, water-miscible emollient, bath oil, anti-pruritic tar oil, colloidal oatmeal etc    Apply an emollient liberally and often, particularly shortly after bathing, and when itchy  The drier the skin, the thicker this should be, especially on the hands  What is the outlook for dry skin? A tendency to dry skin may persist life-long, or it may improve once contributing factors are controlled      KERATOSIS PILARIS    Assessment and Plan:  Based on a thorough discussion of this condition and the management approach to it (including a comprehensive discussion of the known risks, side effects and potential benefits of treatment), the patient (family) agrees to implement the following specific plan:  Can try amlactin, cerave SA, [place] rough and bumpy  You must moisturize several times a day to help this condition as KP is chronic  Keratosis pilaris is a very common condition that appears as tiny bumps on the skin that may look like goosebumps or small pimples  These bumps are composed of small plugs of dead skin cells and are most commonly found over the upper arms and thighs  Children may also find bumps on their cheeks  Keratosis pilaris is harmless and affects up to half of normal children and up to three quarters of children who have ichthyosis vulgaris (a dry skin condition due to filaggrin gene mutations)  It is also more common in children with atopic eczema  Common symptoms of keratosis pilaris begin before age 3 or during the teenage years  Bumps over the outer aspect of upper arms and thighs symmetrically that feel like sandpaper  Potentially over buttocks, sides of cheeks, forearms, and upper back  Scaly, skin colored or red spots in keratosis pilaris rubra  Non-painful, but potential to be itchy     Keratosis pilaris is caused by abnormal growth of skin cells lining the upper portion of the hair follicles  Instead of naturally sloughing off, scaly skin will pile up and fill the follicle  There is a strong association with genetics, meaning that the condition has a high chance of being inherited if one or both parents are affected  It can also occur as a side effect of cancer therapies such as vemurafenib  Treating dry skin often helps as dry skin can cause the bumps to be more noticeable  Many people notice that the bumps disappear in the summer months when there is more moisture in the air  Sometimes, keratosis pilaris fades as one grows older, but puberty and hormonal therapy can cause flare-ups   If itch, dryness, or appearance bother you, treatment options include:  Using non-soap cleansers to minimize dryness of the skin   Exfoliation in the shower using a pumice stone or exfoliating sponge  Ammonium lactate cream or lotion (12%)   A moisturizing cream or ointment applied at least 2-3x a day and after bathing   Creams containing urea or lactic acid are especially helpful   Other medicines that remove dead skin cells can also be effective   Alpha hydroxyl acid  Glycolic acid  Retinoids (adapalene, retinol, tazarotene, trentinoin)  Salicylic acid  Pulse dye laser treatments or intense pulsed light can reduce redness temporarily, but not roughness   Laser assisted hair removal     DERMATOFIBROMA    Assessment and Plan:  Based on a thorough discussion of this condition and the management approach to it (including a comprehensive discussion of the known risks, side effects and potential benefits of treatment), the patient (family) agrees to implement the following specific plan:  Benign, assurance provided     Assessment and Plan:  A dermatofibroma is a common benign fibrous nodule that most often arises on the skin of the lower legs  A dermatofibroma is also called a "cutaneous fibrous histiocytoma "  Dermatofibromas occur at all ages and in people of every ethnicity  They are more common in women than in men  It is not clear if dermatofibroma is a reactive process or if it is a neoplasm  The lesions are made up of proliferating fibroblasts  Histiocytes may also be involved  They are sometimes attributed to an insect bite or ingrownhair or local trauma, but not consistently  They may be more numerous in patients with altered immunity  Dermatofibromas most often occur on the legs and arms, but may also arise on the trunk or any site of the body  Typical clinical features include the following:  People may have 1 or up to 15 lesions  Size varies from 0 5-1 5 cm diameter; most lesions are 7-10 mm diameter  They are firm nodules tethered to the skin surface and mobile over subcutaneous tissue    The skin "dimples" on pinching the lesion  Color may be pink to light brown in white skin, and dark brown to black in dark skin; some appear paler in the center  They do not usually cause symptoms, but they are sometimes painful or itchy  Because they are often raised lesions, they may be traumatized, for example by a razor  Occasionally dozens may erupt within a few months, usually in the setting of immunosuppression (for example autoimmune disease, cancer or certain medications)  Dermatofibroma does not give rise to cancer  However, occasionally, it may be mistaken for dermatofibrosarcoma or desmoplastic melanoma  A dermatofibroma is harmless and seldom causes any symptoms  Usually, only reassurance is needed  If it is nuisance or causing concern, the lesion can be removed surgically, resulting in a scar that is, by definition, usually longer in diameter than the widest portion of the dermatofibroma  Cryotherapy, shave biopsy and laser surgery are rarely completely successful  Skin punch biopsy or incisional biopsy may be undertaken if there is an atypical feature such as recent enlargement, ulceration, or asymmetrical structures and colours on dermatoscopy

## 2022-09-07 ENCOUNTER — OFFICE VISIT (OUTPATIENT)
Dept: INTERNAL MEDICINE CLINIC | Facility: CLINIC | Age: 37
End: 2022-09-07
Payer: COMMERCIAL

## 2022-09-07 VITALS
OXYGEN SATURATION: 98 % | BODY MASS INDEX: 24.11 KG/M2 | SYSTOLIC BLOOD PRESSURE: 100 MMHG | WEIGHT: 131 LBS | TEMPERATURE: 98 F | HEIGHT: 62 IN | DIASTOLIC BLOOD PRESSURE: 60 MMHG | HEART RATE: 83 BPM

## 2022-09-07 DIAGNOSIS — K58.0 IRRITABLE BOWEL SYNDROME WITH DIARRHEA: ICD-10-CM

## 2022-09-07 DIAGNOSIS — N30.10 INTERSTITIAL CYSTITIS: ICD-10-CM

## 2022-09-07 DIAGNOSIS — Z71.84 COUNSELING ABOUT TRAVEL: ICD-10-CM

## 2022-09-07 DIAGNOSIS — E03.9 ACQUIRED HYPOTHYROIDISM: Primary | ICD-10-CM

## 2022-09-07 PROCEDURE — 99214 OFFICE O/P EST MOD 30 MIN: CPT | Performed by: INTERNAL MEDICINE

## 2022-09-07 NOTE — PROGRESS NOTES
Assessment/Plan:    IBS (irritable bowel syndrome)  Following up with GI and nutritionist    Interstitial cystitis  Avoid dietary triggers    <5y since typhoid vaccine  Up to date with hep A vaccine  Flu vaccine in the fall     Problem List Items Addressed This Visit        Digestive    IBS (irritable bowel syndrome)     Following up with GI and nutritionist            Endocrine    Hypothyroid - Primary    Relevant Orders    CBC and Platelet    Basic metabolic panel    Lipid Panel with Direct LDL reflex    TSH, 3rd generation with Free T4 reflex       Genitourinary    Interstitial cystitis     Avoid dietary triggers           Other Visit Diagnoses     Counseling about travel        Relevant Orders    Ambulatory Referral to Travel Clinic            Subjective:      Patient ID: Shalini Abel is a 31 y o  adult  HPI  Here for a follow up  COVID mid July and has lingering fatigue, SOB with moderate exertion  Overall improving  IBS SIBO and Citrucelle helping  Rifaximin did not help  Seeing dietician soon  No migraines for years (had Zofran and Imitrex PRN)  Using Phexxi, no UTIs   +IC with known dietary triggers, frequent urgency  Traveling to [facility] end of Oct for a month  Asking about typhoid vaccine  Completed hep A vaccination in the past    The following portions of the patient's history were reviewed and updated as appropriate: allergies, current medications, past family history, past medical history, past social history, past surgical history and problem list     Review of Systems   Constitutional: Positive for fatigue  Respiratory: Positive for shortness of breath  Negative for cough  Cardiovascular: Negative for chest pain and palpitations  Gastrointestinal: Negative for abdominal pain, constipation and diarrhea  Genitourinary: Positive for urgency  Negative for dysuria  Neurological: Positive for dizziness (with quick movements)  Negative for headaches     Psychiatric/Behavioral: Negative for sleep disturbance  Objective:      /60 (BP Location: Left arm, Patient Position: Sitting, Cuff Size: Adult)   Pulse 83   Temp 98 °F (36 7 °C) (Probe)   Ht 5' 2" (1 575 m)   Wt 59 4 kg (131 lb)   SpO2 98%   BMI 23 96 kg/m²          Physical Exam  Constitutional:       General: Obinna Wagner is not in acute distress  Appearance: Obinna Wagner is well-developed  Obinna Wagner is not ill-appearing, toxic-appearing or diaphoretic  HENT:      Head: Normocephalic and atraumatic  Eyes:      Conjunctiva/sclera: Conjunctivae normal    Cardiovascular:      Rate and Rhythm: Normal rate and regular rhythm  Heart sounds: Normal heart sounds  No murmur heard  Pulmonary:      Effort: Pulmonary effort is normal  No respiratory distress  Breath sounds: Normal breath sounds  No wheezing or rales  Abdominal:      General: There is no distension  Palpations: Abdomen is soft  There is no mass  Tenderness: There is no abdominal tenderness  There is no guarding or rebound  Musculoskeletal:      Cervical back: Neck supple  Right lower leg: No edema  Left lower leg: No edema  Skin:     General: Skin is warm and dry  Neurological:      Mental Status: Obinna Wagner is alert and oriented to person, place, and time  Psychiatric:         Mood and Affect: Mood normal          Behavior: Behavior normal          Thought Content:  Thought content normal          Judgment: Judgment normal

## 2022-09-22 ENCOUNTER — CLINICAL SUPPORT (OUTPATIENT)
Dept: NUTRITION | Facility: HOSPITAL | Age: 37
End: 2022-09-22
Payer: COMMERCIAL

## 2022-09-22 VITALS — WEIGHT: 131.5 LBS | BODY MASS INDEX: 24.2 KG/M2 | HEIGHT: 62 IN

## 2022-09-22 DIAGNOSIS — K58.2 IRRITABLE BOWEL SYNDROME WITH BOTH CONSTIPATION AND DIARRHEA: ICD-10-CM

## 2022-09-22 PROCEDURE — 97802 MEDICAL NUTRITION INDIV IN: CPT

## 2022-09-22 NOTE — PROGRESS NOTES
Initial Nutrition Assessment Form    Patient Name: Shalini Abel    YOB: 1985    Sex: Adult     Assessment Date: 9/22/2022  Start Time: 2:35 pm Stop Time: 3:40 pm Total Minutes: 65 min     Data:  Present at session: Self   Parent/Patient Concerns: "I am having an issue with IBS"   Medical Dx/Reason for Referral: K58 2 (ICD-10-CM) - Irritable bowel syndrome with both constipation and diarrhea   Past Medical History:   Diagnosis Date    Acne     Eczema     Migraine with aura and without status migrainosus, not intractable     Ovarian cyst        Current Outpatient Medications   Medication Sig Dispense Refill    levothyroxine (Euthyrox) 88 mcg tablet Take 1 tablet (88 mcg total) by mouth daily 90 tablet 3    Melatonin Gummies 2 5 MG CHEW Chew If needed      ondansetron (ZOFRAN-ODT) 4 mg disintegrating tablet Take 1 tablet (4 mg total) by mouth every 6 (six) hours as needed for nausea or vomiting (Patient not taking: No sig reported) 10 tablet 0    Phexxi 1 8-1-0 4 % GEL INSERT 1 APPLICATOR INTO THE VAGINA DAILY IMMEDIATELY PRIOR TO INTERCOURSE      Soolantra 1 % CREA       spironolactone (ALDACTONE) 50 mg tablet Take 1 tablet (50 mg total) by mouth 2 (two) times a day 180 tablet 1    SUMAtriptan (IMITREX) 100 mg tablet TAKE 1 TABLET (100 MG TOTAL) BY MOUTH ONCE AS NEEDED FOR MIGRAINE FOR UP TO 1 DOSE (Patient not taking: No sig reported) 10 tablet 0    tretinoin (RETIN-A) 0 025 % cream Apply topically in the morning       No current facility-administered medications for this visit          Additional Meds/Supplements: Multivitamin, Melatonin, Calcium    Special Learning Needs:  N/A   Height: 5'2" HC Readings from Last 5 Encounters:   No data found for [facility], [facility]       Weight: 131# 8oz Wt Readings from Last 10 Encounters:   09/07/22 59 4 kg (131 lb)   09/06/22 60 8 kg (134 lb)   08/15/22 61 kg (134 lb 6 4 oz)   07/12/22 59 5 kg (131 lb 3 2 oz)   06/27/22 59 9 kg (132 lb)   06/01/22 58 7 kg (129 lb 8 oz)   05/03/22 59 kg (130 lb)   04/05/22 59 3 kg (130 lb 12 8 oz)   03/03/22 61 1 kg (134 lb 12 8 oz)   01/24/22 61 2 kg (135 lb)     Estimated body mass index is 23 96 kg/m² as calculated from the following:    Height as of 9/7/22: 5' 2" (1 575 m)  Weight as of 9/7/22: 59 4 kg (131 lb)  Recent Weight Change: []Yes     [x]No  Amount:       Energy Needs: [address] Equation:    Actual wt AF 1 4  1751 kcal for wt maintenance   60-72 g protein ( 1 0-1 2 g/kg actual wt)     No Known Allergies [facility]   Social History     Substance and Sexual Activity   Alcohol Use Yes    Comment: social       Social History     Tobacco Use   Smoking Status Never Smoker   Smokeless Tobacco Never Used       Who shops? mother   Who cooks? mother   Exercise: 4-5 days/week doing some form of exercise for 30+ minutes-nto to thr point of heavily sweating   Prior Counseling?  [x]Yes     []No  When:  randomly, pt's mom is a RD     Why:  variety of reasons        Diet Hx:    Fluids: water, sparkling water or mineral water (1 can or less per day)  [place] milk (sensitivity, doesn't go back as quick), can tolerate cheese & yogurt (pt loves cheese & yogurt)  Breakfast: daily Coffee 1/2 caf (creamer) + water + bread with almond butter  Or  Oatmeal with chopped fruit or nuts and/or honey, sometimes makes with almond milk   Lunch: can be a struggle, job is based on the [employer] ( 3 hours behind) [place] uses beans for protein  Or  sandwich     Dinner: [place], corn on the cob  (protein, vegetable, grain)         Snacks: Apple & cheese, almonds, rice cakes, pretzles        Nutrition Diagnosis:   Altered gastrointestinal function  related to IBS as evidenced by  Per pr interview, provider dx       Medical Nutrition Therapy Intervention:  [x]Individualized Meal Plan: 1751 kcal for wt maintenance   60-72 g protein ( 1 0-1 2 g/kg actual wt) []Understanding Lab Values   []Basic Pathophysiology of Disease []Food/Medication Interactions   []Food Diary [x]Exercise: Encourage 150 min/week   [x]Lifestyle/Behavior Modification Techniques: meal prep for lunch, eating dinner earlier, increased fiber, fruits, vegetables []Medication, Mechanism of Action   [x]Label Reading: protein, fiber []Self Blood Glucose Monitoring   [x]Weight/BMI Goals: Wt is not a topic/focus at this time []Other -    Other Notes/Assessment:    Pt lives with parents, moved back in around the time of the beginning of COVID-19, pt reports this is not meant to be permanent  No smoking, alcohol use is social, moderate    UX researcher:, "studies how people use technologies" part-time, works from home  Also does free-kahlil work in the [employer] field  Feels the free kahlil work has added some more stress  Exercise: Pt had covid-19 07/2022 & since aerobic activity has been more tiring than prior to COVID-19    IBS DX for a few years but over the last year pt was becoming more constipated pt feels it was possibly d/t nortriptyline  which was used in a low dose for diarrhea from 2017-earlier this year, seeing GI, bloating, nausea  Currently bloating and constipation are the main symptoms  Pt has been trying to do the FODMAP diet since 11/2021  Had tried eliminating all high FODMAP foods  Pt states she has become frustrated with the FODMAP diet and did not find any connection between foods & symptoms  At this time pt is not eliminating any foods  Pt feels that the overanalyzing of foods/what she is eating is slightly triggering r/t hx of eating disorder/disordered eating  Pt notes when she was a teenager she struggled with an eating disorder and then struggled with it off and on throughout college, went to a therapist on college that went over intuitive eating and that was helpful to pt  PT reports being "ok" until the summer of 2020 when she gained some wt d/t COVID-19 pandemic  PT has started fiber supplements that has helped her    She feels d/t changes in diet she has gotten out of the habit of eating foods higher in fiber  PT also feels that living with her parents has had influences her eating habits compared to when she was cooking on her own living in [place]  *Less fiber, especially from fruits & vegetables-mainly with dinner  Pt used to do a lot of food prep and and would almost daily would make a bowl containing a grain, green leafy vegetable, legume and/or soft boiled egg, pt would often eat this for lunch or as an early dinner  *Pt feels like she is often getting in the way if she tries to cook when her parents are cooking and they cook "elaborate" meals most every day  By the time pt is done work, her parents are either cooking, eating, or cleaning up and they eat late so this process is done around the time pt likely goes to bed so not much time for food prep at night  *Pt works from home, job is based in [place] ( 3 hours behind [place]), so she often has meetings when it is lunchtime in this time zone  Pt feels she is often not eating much or is grabbing something quick-also r/t feeling like she doesn't have free use of the kitchen  *Pt's main meal is dinner, usually after 7 PM and pt often is given and eats larger portions than she normally would eat, then goes to bed shortly after eating   *In [place], pt lived within walking distance to many of the stores/places she needed to go & would walk instead of using her car  Pt does exercise daily but feels most of the day she is more sedentary  Suggested that it is possible the changes in pt's dietary habits, activity, & routine may be influencing issues with pt's GI symptoms such as eating more later at night, not being as active, less fiber and fruits/vegetables    Since pt is looking to try something other than going back on the FODMAP diet discussed with pt diet lifestyle modifications that pt is agreeable to and feels will be helpful and realistic:    *Pt plans to find time on Sunday to meal prep and start to make bowls again for lunches  *For dinner, have leftovers from what parents prepared/ate the night before but earlier-and still go out and sit with parents while they eat, have a snack if pt wants (they all enjoy that time together)  *Increase fiber to 25-30g and fruits/veg to 2-3c/day (provided verbal/written nutrition education)  *Continue with adequate water consumption  *Encouraged pt to sit on the toilet 5-10 minutes after eating, putting feet on a stool if possible and sit for about 15 min as a means possibly train bowels  Comprehension: []Excellent  [x]Very Good  []Good  []Fair   []Poor    Receptivity: []Excellent  [x]Very Good  []Good  []Fair   []Poor    Expected Compliance: []Excellent  [x]Very Good  []Good  []Fair   []Poor        Goals:  1  Prepare food for lunches on Sunday for the rest of the week   2  Increase fiber to 25-30g/day   3  No follow-ups on file    Labs:  CMP  Lab Results   Component Value Date    K 4 3 04/28/2022     04/28/2022    CO2 28 04/28/2022    BUN 13 04/28/2022    CREATININE 1 08 04/28/2022    GLUF 83 12/01/2021    CALCIUM 9 3 04/28/2022    AST 15 04/28/2022    ALT 19 04/28/2022    ALKPHOS 50 04/28/2022    EGFR 79 10/05/2021       BMP  Lab Results   Component Value Date    CALCIUM 9 3 04/28/2022    K 4 3 04/28/2022    CO2 28 04/28/2022     04/28/2022    BUN 13 04/28/2022    CREATININE 1 08 04/28/2022       Lipids  No results found for: CHOL  Lab Results   Component Value Date    HDL 61 12/01/2021     Lab Results   Component Value Date    LDLCALC 113 (H) 12/01/2021     Lab Results   Component Value Date    TRIG 78 12/01/2021     No results found for: CHOLHDL    Hemoglobin A1C  No results found for: HGBA1C    Fasting Glucose  Lab Results   Component Value Date    GLUF 83 12/01/2021       Insulin     Thyroid  No results found for: TSH, D6WRGQV, X5NJIEB, THYROIDAB    Hepatic Function Panel  Lab Results   Component Value Date    ALT 19 04/28/2022    AST 15 04/28/2022    ALKPHOS 50 04/28/2022       Celiac Disease Antibody Panel  Endomysial IgA   Date Value Ref Range Status   04/28/2022 Negative Negative Final     Gliadin IgA   Date Value Ref Range Status   04/28/2022 4 0 - 19 units Final     Comment:                        Negative                   0 - 19                     Weak Positive             20 - 30                     Moderate to Strong Positive   >30     Gliadin IgG   Date Value Ref Range Status   04/28/2022 2 0 - 19 units Final     Comment:                        Negative                   0 - 19                     Weak Positive             20 - 30                     Moderate to Strong Positive   >30     IgA   Date Value Ref Range Status   04/28/2022 177 87 - 352 mg/dL Final     TISSUE TRANSGLUTAMINASE IGA   Date Value Ref Range Status   04/28/2022 <2 0 - 3 U/mL Final     Comment:                                   Negative        0 -  3                                Weak Positive   4 - 10                                Positive           >10   Tissue Transglutaminase (tTG) has been identified   as the endomysial antigen  Studies have demonstr-   ated that endomysial IgA antibodies have over 99%   specificity for gluten sensitive enteropathy        Iron  No results found for: IRON, TIBC, [address]  [address] 546 [facility]

## 2022-10-05 DIAGNOSIS — R19.7 DIARRHEA, UNSPECIFIED TYPE: Primary | ICD-10-CM

## 2022-10-05 RX ORDER — CIPROFLOXACIN 500 MG/1
500 TABLET, FILM COATED ORAL 3 TIMES DAILY
Qty: 9 TABLET | Refills: 0 | Status: SHIPPED | OUTPATIENT
Start: 2022-10-05 | End: 2022-10-08

## 2022-10-13 ENCOUNTER — CLINICAL SUPPORT (OUTPATIENT)
Dept: NUTRITION | Facility: HOSPITAL | Age: 37
End: 2022-10-13
Payer: COMMERCIAL

## 2022-10-13 DIAGNOSIS — K58.0 IRRITABLE BOWEL SYNDROME WITH DIARRHEA: ICD-10-CM

## 2022-10-13 PROCEDURE — 97803 MED NUTRITION INDIV SUBSEQ: CPT

## 2022-10-13 NOTE — PROGRESS NOTES
Follow-Up Nutrition Assessment Form    Patient Name: Viola Burton    YOB: 1985    Sex: Adult      Follow Up Date: 10/13/2022  Start Time: 12:36 PM Stop Time: 1:08 PM Total Minutes:  32 min     Data:  Present at session: Pt   Parent/Patient Concerns: "things have been going well, I am going abroad and am worried about getting sick from the water"   Medical Dx/Reason for Referral: K58 2 (ICD-10-CM) - Irritable bowel syndrome with both constipation and diarrhea    Provider: Leonel Haynes   Past Medical History:   Diagnosis Date   • Acne    • Eczema    • Migraine with aura and without status migrainosus, not intractable    • Ovarian cyst        Current Outpatient Medications   Medication Sig Dispense Refill   • levothyroxine (Euthyrox) 88 mcg tablet Take 1 tablet (88 mcg total) by mouth daily 90 tablet 3   • Melatonin Gummies 2 5 MG CHEW Chew If needed     • ondansetron (ZOFRAN-ODT) 4 mg disintegrating tablet Take 1 tablet (4 mg total) by mouth every 6 (six) hours as needed for nausea or vomiting (Patient not taking: No sig reported) 10 tablet 0   • Phexxi 1 8-1-0 4 % GEL INSERT 1 APPLICATOR INTO THE VAGINA DAILY IMMEDIATELY PRIOR TO INTERCOURSE     • Soolantra 1 % CREA      • spironolactone (ALDACTONE) 50 mg tablet Take 1 tablet (50 mg total) by mouth 2 (two) times a day 180 tablet 1   • SUMAtriptan (IMITREX) 100 mg tablet TAKE 1 TABLET (100 MG TOTAL) BY MOUTH ONCE AS NEEDED FOR MIGRAINE FOR UP TO 1 DOSE (Patient not taking: No sig reported) 10 tablet 0   • tretinoin (RETIN-A) 0 025 % cream Apply topically in the morning       No current facility-administered medications for this visit          Additional Meds/Supplements: Multivitamin, Melatonin, Calcium    Barriers to Learning: NA   Labs: N/A   Height: 5'2" Ht Readings from Last 3 Encounters:   09/22/22 5' 2" (1 575 m)   09/07/22 5' 2" (1 575 m)   09/06/22 5' 2" (1 575 m)      Weight: Pt did not want to get wt today Wt Readings from Last 10 Encounters:   09/22/22 59 6 kg (131 lb 8 oz)   09/07/22 59 4 kg (131 lb)   09/06/22 60 8 kg (134 lb)   08/15/22 61 kg (134 lb 6 4 oz)   07/12/22 59 5 kg (131 lb 3 2 oz)   06/27/22 59 9 kg (132 lb)   06/01/22 58 7 kg (129 lb 8 oz)   05/03/22 59 kg (130 lb)   04/05/22 59 3 kg (130 lb 12 8 oz)   03/03/22 61 1 kg (134 lb 12 8 oz)     Estimated body mass index is 24 05 kg/m² as calculated from the following:    Height as of 9/22/22: 5' 2" (1 575 m)  Weight as of 9/22/22: 59 6 kg (131 lb 8 oz)  Wt  Change Since Last Visit: N/A []Yes     []No  Amount:       Energy Needs: Actual wt AF 1 4  1751 kcal for wt maintenance   60-72 g protein ( 1 0-1 2 g/kg actual wt)   Pain Screen: Are you having pain now? N/A      Previous Goals or Goals Achieved:   1  Prepare food for lunches on Sunday for the rest of the week (just started doing this the past week)   2  Increase fiber to 25-30g/day   (Pt meeting goal most days)          New Goals:  Pt would like to continue with the same goals  1    2    3        Initial PES:    Altered gastrointestinal function related to IBS as evidenced by Per pr interview, provider dx   New PES: No change      New Problem List:  1  N/A   2    3        Assessment:    Pt reports since last appt she has had less issues with constipation, a few episodes of diarrhea but overall positive improvement  Pt is leaving to go on an extended trip to [facility]   Pt will be there for 1 mo-> 2 wks more "tourist" like, the other 2 wks more access to food prep where they are stating  , stressed that she is going to get ill from water-this has happened multiple times in the past   Discussed some tips for food safety while traveling  Pt has been working on more on meal prep, eating more fiber-especially at lunch with vegetables and whole grains, beans  Pt has been having greek yogurt mid morning(new)   Pt is eating more in general before the evening hours-finds herPt has talked with mom about previous appointment (she is also a RD)  Parents have been trying eat earlier, make more meatless meals  Parents seemed happy to have had pt talk to them about it  She feels it has helped them with implementing changes that are beneficial to her without her since it was suggested/talked about with someone else vs pt having to ask them  Started to be more active even if just to go on a walk around the block  Still same other activities: 4-5 days/week doing some form of exercise for 30+ minutes-not to the point of heavily sweating  Pt is also starting to go to restorative yoga 1x/wk    No changes with fluids: Water, sparkling water or mineral water (1 can or less per day)  [place] milk (sensitivity, doesn't go back as quick), can tolerate cheese & yogurt (pt loves cheese & yogurt)      Pt happy with the lifestyle and dietary changes she has made and plans to continue with and build on  She wants to continue to listen to her hunger and full cues, to keep her working on moving her body vs being stationary, focusing on meatless meals, eating larger meal earlier in the day  Pt did not make a follow up at this time  Plans to continue with making dietary/lifestyle changes and will call if needed       Medical Nutrition Therapy Intervention:  [x]Individualized Meal Plan:  1751 kcal for wt maintenance   60-72 g protein ( 1 0-1 2 g/kg actual wt) []Understanding Lab Values   []Basic Pathophysiology of Disease []Food/Medication Interactions   []Food Diary [x]Exercise: continue with current exercise habits   [x]Lifestyle/Behavior Modification Techniques: Eating larger meal earlier on in the day, increased/adequate fiber intake []Medication, Mechanism of Action   []Label Reading []Self Blood Glucose Monitoring   [x]Weight/BMI Goals: Pt still does not walk to talk about/focus on this time d/t hx of disordered eating []Other -    Other Notes:        Comprehension: []Excellent  [x]Very Good  []Good  []Fair   []Poor    Receptivity: []Excellent  [x]Very Good  []Good  []Fair   []Poor    Expected Compliance: []Excellent  [x]Very Good  []Good  []Fair   []Poor      Labs:  CMP  Lab Results   Component Value Date    K 4 3 04/28/2022     04/28/2022    CO2 28 04/28/2022    BUN 13 04/28/2022    CREATININE 1 08 04/28/2022    GLUF 83 12/01/2021    CALCIUM 9 3 04/28/2022    AST 15 04/28/2022    ALT 19 04/28/2022    ALKPHOS 50 04/28/2022    EGFR 79 10/05/2021       BMP  Lab Results   Component Value Date    CALCIUM 9 3 04/28/2022    K 4 3 04/28/2022    CO2 28 04/28/2022     04/28/2022    BUN 13 04/28/2022    CREATININE 1 08 04/28/2022       Lipids  No results found for: CHOL  Lab Results   Component Value Date    HDL 61 12/01/2021     Lab Results   Component Value Date    LDLCALC 113 (H) 12/01/2021     Lab Results   Component Value Date    TRIG 78 12/01/2021     No results found for: CHOLHDL    Hemoglobin A1C  No results found for: HGBA1C    Fasting Glucose  Lab Results   Component Value Date    GLUF 83 12/01/2021       Insulin     Thyroid  No results found for: TSH, S7KUTMR, I3ZBRCY, THYROIDAB    Hepatic Function Panel  Lab Results   Component Value Date    ALT 19 04/28/2022    AST 15 04/28/2022    ALKPHOS 50 04/28/2022       Celiac Disease Antibody Panel  Endomysial IgA   Date Value Ref Range Status   04/28/2022 Negative Negative Final     Gliadin IgA   Date Value Ref Range Status   04/28/2022 4 0 - 19 units Final     Comment:                        Negative                   0 - 19                     Weak Positive             20 - 30                     Moderate to Strong Positive   >30     Gliadin IgG   Date Value Ref Range Status   04/28/2022 2 0 - 19 units Final     Comment:                        Negative                   0 - 19                     Weak Positive             20 - 30                     Moderate to Strong Positive   >30     IgA   Date Value Ref Range Status   04/28/2022 177 87 - 352 mg/dL Final     TISSUE TRANSGLUTAMINASE IGA   Date Value Ref Range Status   04/28/2022 <2 0 - 3 U/mL Final     Comment:                                   Negative        0 -  3                                Weak Positive   4 - 10                                Positive           >10   Tissue Transglutaminase (tTG) has been identified   as the endomysial antigen  Studies have demonstr-   ated that endomysial IgA antibodies have over 99%   specificity for gluten sensitive enteropathy        Iron  No results found for: IRON, TIBC, FERRITIN    Vitamins  No results found for: VITAMIN B2   No results found for: NICOTINAMIDE, NICOTINIC ACID   No results found for: VITAMINB6  No results found for: BOEOEINA37  No results found for: VITB5  No results found for: [id number]  No results found for: THYROGLB  No results found for: VITAMIN K   No results found for: 25-HYDROXY VIT D   No components found for: VITAMINE       Brandy Ruff, [facility]  [address]

## 2022-10-13 NOTE — LETTER
October 13, 2022     Patient: Umesh Brito   YOB: 1985   Date of Visit: 10/13/2022       To Whom It May Concern:    Ema Rodriguez attended an appointment with myself and his daughter Ekaterina Zarcor today  If you have any questions or concerns, please don't hesitate to call           Sincerely,        Vanessa Anderson, [address] [place] [address]    [phone]

## 2022-10-19 ENCOUNTER — ANNUAL EXAM (OUTPATIENT)
Dept: OBGYN CLINIC | Facility: CLINIC | Age: 37
End: 2022-10-19
Payer: COMMERCIAL

## 2022-10-19 VITALS
HEIGHT: 62 IN | SYSTOLIC BLOOD PRESSURE: 100 MMHG | WEIGHT: 133.8 LBS | BODY MASS INDEX: 24.62 KG/M2 | DIASTOLIC BLOOD PRESSURE: 68 MMHG

## 2022-10-19 DIAGNOSIS — Z01.419 ENCOUNTER FOR ANNUAL ROUTINE GYNECOLOGICAL EXAMINATION: Primary | ICD-10-CM

## 2022-10-19 DIAGNOSIS — L70.0 ACNE VULGARIS: ICD-10-CM

## 2022-10-19 DIAGNOSIS — N30.10 INTERSTITIAL CYSTITIS: ICD-10-CM

## 2022-10-19 DIAGNOSIS — G43.109 MIGRAINE WITH AURA AND WITHOUT STATUS MIGRAINOSUS, NOT INTRACTABLE: ICD-10-CM

## 2022-10-19 PROCEDURE — 99385 PREV VISIT NEW AGE 18-39: CPT | Performed by: OBSTETRICS & GYNECOLOGY

## 2022-10-19 NOTE — PROGRESS NOTES
Assessment/Plan:  Pap smear done as well as annual   Encouraged self-breast examination as well as calcium supplementation  Discussed treatment options for contraception, aware options are limited given history of migraine/aura  She has tried numerous progesterone agents  We also discussed use of barrier methods versus progesterone only agents  At this point she would like to continue to use phexi  Return to office in 1 year or p r n  No problem-specific Assessment & Plan notes found for this encounter  Diagnoses and all orders for this visit:    Encounter for annual routine gynecological examination  -     Thinprep Tis and HPV mRNA E6/E7    Migraine with aura and without status migrainosus, not intractable    Acne vulgaris    Interstitial cystitis          Subjective:      Patient ID: Wali Tsai is a 30 y o  adult  HPI     This is a pleasant 61-year-old female G0 who presents as a new patient for gyn exam   She has relocated to the [facility] last year from [place]  She states her menstrual cycles are regular every 26-28 days lasting 4 days with no breakthrough bleeding  She denies any changes in bowel or bladder function  She is not currently sexually active  She has had partners including male and female  She does have a history of migraine headaches with aura and has avoided all estrogen products  Other forms of contraception included Mirena and Diana IUD which resulted in side effects of cramping and acne  She does have a long history of acne and follows up with Dermatology  She does use condoms regularly  She was considering using phexi  She expressed concerns as she does have a history of interstitial cystitis  She did experiment using the agent and had no reaction but has not used it during sexual activity  She is not currently sexually active  She has also been evaluated by Gastroenterology, history of irritable bowel syndrome    The following portions of the patient's history were reviewed and updated as appropriate: allergies, current medications, past family history, past medical history, past social history, past surgical history and problem list     Review of Systems   Constitutional: Negative for fatigue, fever and unexpected weight change  Respiratory: Negative for cough, chest tightness, shortness of breath and wheezing  Cardiovascular: Negative  Negative for chest pain and palpitations  Gastrointestinal: Negative  Negative for abdominal distention, abdominal pain, blood in stool, constipation, diarrhea, nausea and vomiting  Genitourinary: Negative  Negative for difficulty urinating, dyspareunia, dysuria, flank pain, frequency, genital sores, hematuria, pelvic pain, urgency, vaginal bleeding, vaginal discharge and vaginal pain  Skin: Negative for rash  Objective:      /68   Ht 5' 2" (1 575 m)   Wt 60 7 kg (133 lb 12 8 oz)   LMP 10/01/2022   BMI 24 47 kg/m²          Physical Exam  Constitutional:       Appearance: Normal appearance  Roslyn Nelson is well-developed  Cardiovascular:      Rate and Rhythm: Normal rate and regular rhythm  Pulmonary:      Effort: Pulmonary effort is normal       Breath sounds: Normal breath sounds  Chest:   Breasts:      Right: No inverted nipple, mass, nipple discharge, skin change or tenderness  Left: No inverted nipple, mass, nipple discharge, skin change or tenderness  Abdominal:      General: Bowel sounds are normal  There is no distension  Palpations: Abdomen is soft  Tenderness: There is no abdominal tenderness  There is no guarding or rebound  Genitourinary:     Labia:         Right: No rash, tenderness or lesion  Left: No rash, tenderness or lesion  Vagina: Normal  No signs of injury  No vaginal discharge or tenderness  Cervix: No cervical motion tenderness, discharge, friability, lesion, erythema or cervical bleeding        Uterus: Not enlarged and not tender  Adnexa:         Right: No mass, tenderness or fullness  Left: No mass, tenderness or fullness  Neurological:      Mental Status: Liss Thomas is alert and oriented to person, place, and time     Psychiatric:         Behavior: Behavior normal

## 2022-10-20 ENCOUNTER — PROCEDURE VISIT (OUTPATIENT)
Dept: DERMATOLOGY | Facility: CLINIC | Age: 37
End: 2022-10-20

## 2022-10-20 DIAGNOSIS — L71.9 ROSACEA: Primary | ICD-10-CM

## 2022-10-20 PROCEDURE — REJUVPDLFACE1 ROSACEA REJUVENATE 1 TX: Performed by: DERMATOLOGY

## 2022-10-20 NOTE — PROGRESS NOTES
10/20/2022    DO NOT BILL PATIENT OR INSURANCE  [employer] PAID $900 for "3+1" treatments; this is Treatment 1 of 4  Lela FLYNN (Flashlamp Pulsed-Dye  Treatment     PROCEDURE: Flashlamp Pulsed-Dye Laser Treatment  [facility]  Surgeon and : Saintclair Mazzoni  Assistant: [facility]  Photography: YES     After discussing potential procedure related risks including but not limited to pain, purpura, blistering, scarring, discoloration, “footprinting,” recurrence, inefficacy, need for additional treatment, and undesirable cosmetic written and verbal consent were obtained  Anesthetic: None     Safety Precautions:  Fire extinguisher present/Window covered/Staff and patient eyes covered/Warning sign posted     Treatment number: 1  Interim History/Comments:    Percent lightening:   Growth Phase:     Pre-operative Diagnosis: Rosacea  Indications for Surgery:  Cosmesis  Post-operative Diagnosis: same as pre-operative     Parameters: The V Beam laser was set to 595nm wavelength  The cooling device was set to 30 msec duration/20 msec delay     Procedure Note:  After obtaining appropriate consent, patient was brought back to the operating room  Time out was performed  Patient's name, identification and intended procedure were verified  Eye coverings eye shield inserted/placed  The following anatomic locations were treated at the following PDL laser parameters of       10 mm Spot Size; 6 J Fluence; 10 msec Pulse width; 232 pulses    3mm Spot Size; 12 J Fluence; 1 5msec Pulse width; 12 pulses (to isolated pink papules only)    ANATOMIC LOCATION:  Right and Left Cheek, Chin, Glabella/forehead; TOTAL AREA (Cm/2) TREATED:  >200 cm2          Tolerance: Well-tolerated  Complications:   Estimated Blood Loss:  0 cc   Other procedures:         Findings and plans were discussed with the family    Post-op Care: Clobetasol ointment today only; sun protecion "24/7/365"; no exercise or flushing for 48 hours  Disposition:  Discharged to home  Follow-up:  4-6 weeks    PATIENT'S AFTER-CARE INSTRUCTIONS:    • Swelling may occur after the laser treatment  This may last for several days  A cool washcloth or ice pack can be applied if it helps him/her feel better  • Bruising or purplish discoloration may be present for up to 2 weeks in the treated area  Keep the area moist with topical Aquaphor or petroleum jelly until the bruising resolves  • Blistering is rare  If this occurs, generously apply bacitracin ointment until complete healing occurs  • Encourage your child to rest and avoid activities that could result in injury to the treated skin  • Your child can take a bath or shower the day after the procedure  Avoid rubbing or scratching the treated area  • Avoid sun exposure after and between laser treatments  Sun exposure may cause pigmentation changes in the treated areas  In addition, sun exposure can darken and worsen red birthmarks  • Tylenol may be given for any post-operative discomfort (pain is usually minimal)  If severe pain occurs, call our office at [phone] (SKIN); after hours or on the weekends, you will be connected to our on-call dermatology service       CC: Rhys Leigh MD

## 2022-10-24 LAB
CLINICAL INFO: NORMAL
CYTO CVX: NORMAL
CYTOLOGY CMNT CVX/VAG CYTO-IMP: NORMAL
DATE PREVIOUS BX: NORMAL
HPV E6+E7 MRNA CVX QL NAA+PROBE: NOT DETECTED
LMP START DATE: NORMAL
SL AMB PREV. PAP:: NORMAL
SPECIMEN SOURCE CVX/VAG CYTO: NORMAL

## 2022-11-16 ENCOUNTER — TELEPHONE (OUTPATIENT)
Dept: DERMATOLOGY | Facility: CLINIC | Age: 37
End: 2022-11-16

## 2022-11-16 DIAGNOSIS — Z88.1 ALLERGY TO ANTIBIOTIC: Primary | ICD-10-CM

## 2022-11-16 NOTE — TELEPHONE ENCOUNTER
Received call from patient to schedule PDL #3 & #4  I saw that she is already schedule with you on 12/8 during your clinic, so I scheduled her for 1/12 and 2/9 for the last two treatments  Please advise if you would like me to schedule her differently

## 2022-11-21 ENCOUNTER — NURSE TRIAGE (OUTPATIENT)
Dept: OTHER | Facility: OTHER | Age: 37
End: 2022-11-21

## 2022-11-21 DIAGNOSIS — R19.7 ACUTE DIARRHEA: Primary | ICD-10-CM

## 2022-11-21 RX ORDER — DICYCLOMINE HCL 20 MG
20 TABLET ORAL 3 TIMES DAILY PRN
Qty: 90 TABLET | Refills: 1 | Status: SHIPPED | OUTPATIENT
Start: 2022-11-21 | End: 2023-02-13

## 2022-11-21 NOTE — TELEPHONE ENCOUNTER
Regarding: abdominal pains/diarrhea  ----- Message from [facility] sent at 11/21/2022 10:31 AM EST -----  "[place] and developed [place] bacteria infection  I have diarrhea and loose stools and abdominal pains    I also have body aches and chills "

## 2022-11-21 NOTE — TELEPHONE ENCOUNTER
Patient reports speaking to triage RN with provider's office in response to patient My Chart message for symptoms sent on 11/20/22  Office will follow up with patient  Reason for Disposition  • Caller has already spoken with another triager or PCP (or office), and has further questions and triager able to answer questions      Protocols used: NO CONTACT OR DUPLICATE CONTACT CALL-ADULT-[place]

## 2022-11-22 ENCOUNTER — APPOINTMENT (OUTPATIENT)
Dept: LAB | Facility: CLINIC | Age: 37
End: 2022-11-22

## 2022-11-22 DIAGNOSIS — R19.7 ACUTE DIARRHEA: ICD-10-CM

## 2022-11-22 LAB
ALBUMIN SERPL BCP-MCNC: 3.1 G/DL (ref 3.5–5)
ALP SERPL-CCNC: 55 U/L (ref 46–116)
ALT SERPL W P-5'-P-CCNC: 15 U/L (ref 12–78)
ANION GAP SERPL CALCULATED.3IONS-SCNC: 4 MMOL/L (ref 4–13)
AST SERPL W P-5'-P-CCNC: 12 U/L (ref 5–45)
BILIRUB SERPL-MCNC: 0.21 MG/DL (ref 0.2–1)
BUN SERPL-MCNC: 7 MG/DL (ref 5–25)
CALCIUM ALBUM COR SERPL-MCNC: 10 MG/DL (ref 8.3–10.1)
CALCIUM SERPL-MCNC: 9.3 MG/DL (ref 8.3–10.1)
CHLORIDE SERPL-SCNC: 106 MMOL/L (ref 96–108)
CO2 SERPL-SCNC: 28 MMOL/L (ref 21–32)
CREAT SERPL-MCNC: 1.12 MG/DL (ref 0.6–1.3)
ERYTHROCYTE [DISTWIDTH] IN BLOOD BY AUTOMATED COUNT: 12.6 % (ref 11.6–15.1)
GLUCOSE SERPL-MCNC: 97 MG/DL (ref 65–140)
HCT VFR BLD AUTO: 45.4 % (ref 36.5–46.1)
HGB BLD-MCNC: 14.4 G/DL (ref 12–15.4)
MCH RBC QN AUTO: 29.8 PG (ref 26.8–34.3)
MCHC RBC AUTO-ENTMCNC: 31.7 G/DL (ref 31.4–37.4)
MCV RBC AUTO: 94 FL (ref 82–98)
PLATELET # BLD AUTO: 250 THOUSANDS/UL (ref 149–390)
PMV BLD AUTO: 10.6 FL (ref 8.9–12.7)
POTASSIUM SERPL-SCNC: 4.1 MMOL/L (ref 3.5–5.3)
PROT SERPL-MCNC: 7.3 G/DL (ref 6.4–8.4)
RBC # BLD AUTO: 4.83 MILLION/UL (ref 3.88–5.12)
SODIUM SERPL-SCNC: 138 MMOL/L (ref 135–147)
WBC # BLD AUTO: 6.78 THOUSAND/UL (ref 4.31–10.16)

## 2022-11-23 DIAGNOSIS — A03.9 SHIGELLA INFECTION: Primary | ICD-10-CM

## 2022-11-23 LAB
C DIFF TOX GENS STL QL NAA+PROBE: NEGATIVE
CAMPYLOBACTER DNA SPEC NAA+PROBE: ABNORMAL
SALMONELLA DNA SPEC QL NAA+PROBE: ABNORMAL
SHIGA TOXIN STX GENE SPEC NAA+PROBE: ABNORMAL
SHIGELLA DNA SPEC QL NAA+PROBE: DETECTED

## 2022-11-23 RX ORDER — AZITHROMYCIN 250 MG/1
TABLET, FILM COATED ORAL
Qty: 6 TABLET | Refills: 0 | Status: SHIPPED | OUTPATIENT
Start: 2022-11-23 | End: 2022-11-27

## 2022-11-27 LAB — CULTURE ID FROM ENTERIC PCR: ABNORMAL

## 2022-11-30 LAB — CULTURE ID FROM ENTERIC PCR: ABNORMAL

## 2022-12-07 DIAGNOSIS — J11.1 FLU: Primary | ICD-10-CM

## 2022-12-07 RX ORDER — OSELTAMIVIR PHOSPHATE 75 MG/1
75 CAPSULE ORAL EVERY 12 HOURS SCHEDULED
Qty: 10 CAPSULE | Refills: 0 | Status: SHIPPED | OUTPATIENT
Start: 2022-12-07 | End: 2022-12-08 | Stop reason: SDUPTHER

## 2022-12-08 ENCOUNTER — PROCEDURE VISIT (OUTPATIENT)
Dept: DERMATOLOGY | Facility: CLINIC | Age: 37
End: 2022-12-08

## 2022-12-08 DIAGNOSIS — L71.9 ROSACEA: Primary | ICD-10-CM

## 2022-12-08 DIAGNOSIS — J11.1 FLU: ICD-10-CM

## 2022-12-08 RX ORDER — OSELTAMIVIR PHOSPHATE 75 MG/1
75 CAPSULE ORAL EVERY 12 HOURS SCHEDULED
Qty: 10 CAPSULE | Refills: 0 | Status: SHIPPED | OUTPATIENT
Start: 2022-12-08 | End: 2022-12-13

## 2022-12-08 NOTE — PROGRESS NOTES
12/8/2022     DO NOT BILL PATIENT OR INSURANCE  [employer] PAID $900 for "3+1" treatments; this is Treatment 2 of 4      Lela FLYNN (Flashlamp Pulsed-Dye  Treatment     PROCEDURE: Flashlamp Pulsed-Dye Laser Treatment  [facility] of Surgery:  [facility]  Surgeon and : Lauree Hammans  Assistant: Jun  Photography:      After discussing potential procedure related risks including but not limited to pain, purpura, blistering, scarring, discoloration, “footprinting,” recurrence, inefficacy, need for additional treatment, and undesirable cosmetic written and verbal consent were obtained      Anesthetic: None     Safety Precautions:  Fire extinguisher present/Window covered/Staff and patient eyes covered/Warning sign posted     Treatment number: 2  Interim History/Comments:    Percent lightening:   Growth Phase:     Pre-operative Diagnosis: Rosacea  Indications for Surgery:  Cosmesis  Post-operative Diagnosis: same as pre-operative     Parameters:  The V Beam laser was set to 595nm wavelength   The cooling device was set to 30 msec duration/20 msec delay     Procedure Note:  After obtaining appropriate consent, patient was brought back to the operating room   Time out was performed   Patient's name, identification and intended procedure were verified  Eye coverings eye shield inserted/placed       The following anatomic locations were treated at the following PDL laser parameters of        10 mm Spot Size; 6 J Fluence; 6 (down from 10) msec Pulse width; 245 pulses       ANATOMIC LOCATION:  Right and Left Cheek, Chin, Glabella/forehead; TOTAL AREA (Cm/2) TREATED:  >200 cm2           Tolerance: Well-tolerated  Complications:   Estimated Blood Loss:  0 cc   Other procedures:          Findings and plans were discussed with the family    Post-op Care: Clobetasol ointment today only; sun protecion "24/7/365"; no exercise or flushing for 48 hours  Disposition:  Discharged to home  Follow-up:  4-6 weeks     PATIENT'S AFTER-CARE INSTRUCTIONS:     • Swelling may occur after the laser treatment   This may last for several days   A cool washcloth or ice pack can be applied if it helps him/her feel better      • Bruising or purplish discoloration may be present for up to 2 weeks in the treated area  Keep the area moist with topical Aquaphor or petroleum jelly until the bruising resolves      • Blistering is rare   If this occurs, generously apply bacitracin ointment until complete healing occurs      • Encourage your child to rest and avoid activities that could result in injury to the treated skin      • Your child can take a bath or shower the day after the procedure   Avoid rubbing or scratching the treated area       • Avoid sun exposure after and between laser treatments   Sun exposure may cause pigmentation changes in the treated areas   In addition, sun exposure can darken and worsen red birthmarks      • Tylenol may be given for any post-operative discomfort (pain is usually minimal)    If severe pain occurs, call our office at [phone] (SKIN); after hours or on the weekends, you will be connected to our on-call dermatology service      CC: Dilcia Edouard MD

## 2022-12-13 ENCOUNTER — APPOINTMENT (OUTPATIENT)
Dept: LAB | Facility: CLINIC | Age: 37
End: 2022-12-13

## 2022-12-13 DIAGNOSIS — E03.9 ACQUIRED HYPOTHYROIDISM: ICD-10-CM

## 2022-12-13 LAB
ANION GAP SERPL CALCULATED.3IONS-SCNC: 1 MMOL/L (ref 4–13)
BUN SERPL-MCNC: 11 MG/DL (ref 5–25)
CALCIUM SERPL-MCNC: 8.8 MG/DL (ref 8.3–10.1)
CHLORIDE SERPL-SCNC: 107 MMOL/L (ref 96–108)
CHOLEST SERPL-MCNC: 178 MG/DL
CO2 SERPL-SCNC: 30 MMOL/L (ref 21–32)
CREAT SERPL-MCNC: 1 MG/DL (ref 0.6–1.3)
ERYTHROCYTE [DISTWIDTH] IN BLOOD BY AUTOMATED COUNT: 12.2 % (ref 11.6–15.1)
GLUCOSE P FAST SERPL-MCNC: 91 MG/DL (ref 65–99)
HCT VFR BLD AUTO: 42.8 % (ref 36.5–46.1)
HDLC SERPL-MCNC: 63 MG/DL
HGB BLD-MCNC: 14.2 G/DL (ref 12–15.4)
LDLC SERPL CALC-MCNC: 100 MG/DL (ref 0–100)
MCH RBC QN AUTO: 30.5 PG (ref 26.8–34.3)
MCHC RBC AUTO-ENTMCNC: 33.2 G/DL (ref 31.4–37.4)
MCV RBC AUTO: 92 FL (ref 82–98)
PLATELET # BLD AUTO: 234 THOUSANDS/UL (ref 149–390)
PMV BLD AUTO: 10.9 FL (ref 8.9–12.7)
POTASSIUM SERPL-SCNC: 4.1 MMOL/L (ref 3.5–5.3)
RBC # BLD AUTO: 4.66 MILLION/UL (ref 3.88–5.12)
SODIUM SERPL-SCNC: 138 MMOL/L (ref 135–147)
TRIGL SERPL-MCNC: 73 MG/DL
TSH SERPL DL<=0.05 MIU/L-ACNC: 1.9 UIU/ML (ref 0.45–4.5)
WBC # BLD AUTO: 5.75 THOUSAND/UL (ref 4.31–10.16)

## 2022-12-15 DIAGNOSIS — R79.9 ABNORMAL BLOOD CHEMISTRY: Primary | ICD-10-CM

## 2022-12-27 ENCOUNTER — PATIENT MESSAGE (OUTPATIENT)
Dept: DERMATOLOGY | Facility: CLINIC | Age: 37
End: 2022-12-27

## 2022-12-27 DIAGNOSIS — L71.9 ROSACEA: Primary | ICD-10-CM

## 2022-12-27 RX ORDER — IVERMECTIN 10 MG/G
CREAM TOPICAL
Qty: 45 G | Refills: 11 | Status: SHIPPED | OUTPATIENT
Start: 2022-12-27

## 2023-01-12 ENCOUNTER — PROCEDURE VISIT (OUTPATIENT)
Dept: DERMATOLOGY | Facility: CLINIC | Age: 38
End: 2023-01-12

## 2023-01-12 DIAGNOSIS — L71.9 ROSACEA: Primary | ICD-10-CM

## 2023-01-12 NOTE — PATIENT INSTRUCTIONS
PATIENT'S AFTER-CARE INSTRUCTIONS:     Swelling may occur after the laser treatment  This may last for several days  A cool washcloth or ice pack can be applied if it helps him/her feel better  Bruising or purplish discoloration may be present for up to 2 weeks in the treated area  Keep the area moist with topical Aquaphor or petroleum jelly until the bruising resolves  Blistering is rare  If this occurs, generously apply bacitracin ointment until complete healing occurs  Encourage your child to rest and avoid activities that could result in injury to the treated skin  Your child can take a bath or shower the day after the procedure  Avoid rubbing or scratching the treated area  Avoid sun exposure after and between laser treatments  Sun exposure may cause pigmentation changes in the treated areas  In addition, sun exposure can darken and worsen red birthmarks  Tylenol may be given for any post-operative discomfort (pain is usually minimal)  If severe pain occurs, call our office at [phone] (SKIN); after hours or on the weekends, you will be connected to our on-call dermatology service

## 2023-02-13 ENCOUNTER — OFFICE VISIT (OUTPATIENT)
Dept: GASTROENTEROLOGY | Facility: CLINIC | Age: 38
End: 2023-02-13

## 2023-02-13 VITALS
SYSTOLIC BLOOD PRESSURE: 101 MMHG | OXYGEN SATURATION: 97 % | WEIGHT: 130 LBS | HEART RATE: 72 BPM | BODY MASS INDEX: 23.92 KG/M2 | HEIGHT: 62 IN | DIASTOLIC BLOOD PRESSURE: 82 MMHG

## 2023-02-13 DIAGNOSIS — K58.2 IRRITABLE BOWEL SYNDROME WITH BOTH CONSTIPATION AND DIARRHEA: Primary | ICD-10-CM

## 2023-02-13 RX ORDER — DICYCLOMINE HYDROCHLORIDE 10 MG/1
10 CAPSULE ORAL 3 TIMES DAILY PRN
Qty: 90 CAPSULE | Refills: 2 | Status: SHIPPED | OUTPATIENT
Start: 2023-02-13

## 2023-02-13 NOTE — PROGRESS NOTES
[facility] - Outpatient Follow-up Note  Juju Campbell 30 y o  adult MRN: [id number]  Encounter: [id number]          ASSESSMENT AND PLAN:      1  Irritable bowel syndrome with both constipation and diarrhea  Patient with recent Shigella infection around Thanksgiving with bowel movements now 2-3 times a day, [place] stool scale 6  She has tried following low FODMAP diet  Previously tolerated fiber supplement however not currently on  She has tried 2 courses of probiotics, 1 with minimal improvement  -Recommend patient reintroduce fiber supplement such as Citrucel 1 tablespoon daily to help with bowel regularity   - Will also prescribe Bentyl again at the lower dose, 10 mg  Advised that this can be taken 3 times a day as needed or prior to meals to help with abdominal cramping, loose stools and urgency  - If patient is tolerating well, could consider increasing Bentyl to 20 mg again   -Also discussed implementing IBgard in the future if needed which will help mainly with symptoms of gas, bloating and abdominal discomfort   - Continue to avoid any food triggers  Can use low FODMAP diet as a guide instead of trying to follow strictly  -Discussed lifestyle modifications including meditation, yoga and hypnotherapy which patient is interested in trying via [employer]  Patient will be moving to the [place] area and insurance will likely be changing in the next 1 to 2 months  Advised patient she can reach out to us at any point in the meantime to help with any prescription refills and/or advice regarding her symptoms prior to her establishing care with another GI provider   ______________________________________________________________________    SUBJECTIVE:      Juju Campbell is a pleasant 79-year-old female with history of hypothyroidism, migraines who presents to the office for a follow-up  Patient has been previously seen for symptoms of irritable bowel syndrome    Over Thanksgiving she had acute onset diarrhea after coming home from [facility] and is found to have Shigella infection and was treated with azithromycin  For acute symptoms of diarrhea stopped however she has had persistent on and off abdominal cramping with 2-3 loose bowel movements a day, [place] stool scale of around 6  She was given Bentyl during acute onset of symptoms however has not taken this as things slowed down since then  She also was previously on Citrucel and she was having more constipation in the past however has not reintroduced this yet  She has tried two courses of probiotics  The first was with minimal effect however the second 1 seemed to improve things slightly  She will be starting a new job next month which she is aware will come with some new stressors and is hoping to discuss management options of her IBS prior to setting up a new GI care around the [facility]  REVIEW OF SYSTEMS IS OTHERWISE NEGATIVE        Historical Information   Past Medical History:   Diagnosis Date   • Acne    • Eczema    • Migraine with aura and without status migrainosus, not intractable    • Ovarian cyst      Past Surgical History:   Procedure Laterality Date   • COLONOSCOPY     • FOOT SURGERY Left 2020    cysts   • MANDIBLE SURGERY  2006   • REPAIR LABRUM Left 2017     Social History   Social History     Substance and Sexual Activity   Alcohol Use Yes    Comment: social     Social History     Substance and Sexual Activity   Drug Use Yes   • Types: Marijuana    Comment: very rarely gummie       Social History     Tobacco Use   Smoking Status Never   Smokeless Tobacco Never     Family History   Problem Relation Age of Onset   • No Known Problems Mother    • No Known Problems Father    • Skin cancer Paternal Grandmother    • Lung cancer Paternal Grandmother    • Coronary artery disease Paternal Grandmother    • Multiple myeloma Maternal Aunt    • Colon cancer Maternal Uncle    • Coronary artery disease Maternal Grandmother    • No Known Problems Sister    • Celiac disease Family        Meds/Allergies       Current Outpatient Medications:   •  dicyclomine (BENTYL) 10 mg capsule  •  levothyroxine (Euthyrox) 88 mcg tablet  •  Melatonin Gummies 2 5 MG CHEW  •  ondansetron (ZOFRAN-ODT) 4 mg disintegrating tablet  •  Phexxi 1 8-1-0 4 % GEL  •  Soolantra 1 % CREA  •  spironolactone (ALDACTONE) 50 mg tablet  •  SUMAtriptan (IMITREX) 100 mg tablet  •  tretinoin (RETIN-A) 0 025 % cream    Allergies   Allergen Reactions   • Bactrim [Sulfamethoxazole-Trimethoprim] Rash           Objective     Blood pressure 101/82, pulse 72, height 5' 2" (1 575 m), weight 59 kg (130 lb), SpO2 97 %  Body mass index is 23 78 kg/m²  PHYSICAL EXAM:      General Appearance:   Alert, cooperative, no distress   HEENT:   Normocephalic, atraumatic, anicteric      Neck:  Supple, symmetrical, trachea midline   Lungs:   Clear to auscultation bilaterally; no rales, rhonchi or wheezing; respirations unlabored    Heart[de-identified]   Regular rate and rhythm; no murmur, rub, or gallop  Abdomen:   Soft, non-tender, non-distended; normal bowel sounds; no masses, no organomegaly    Genitalia:   Deferred    Rectal:   Deferred    Extremities:  No cyanosis, clubbing or edema    Pulses:  2+ and symmetric    Skin:  No jaundice, rashes, or lesions    Lymph nodes:  No palpable cervical lymphadenopathy        Lab Results:   No visits with results within 1 Day(s) from this visit     Latest known visit with results is:   Appointment on 12/13/2022   Component Date Value   • WBC 12/13/2022 5 75    • RBC 12/13/2022 4 66    • Hemoglobin 12/13/2022 14 2    • Hematocrit 12/13/2022 42 8    • MCV 12/13/2022 92    • MCH 12/13/2022 30 5    • MCHC 12/13/2022 33 2    • RDW 12/13/2022 12 2    • Platelets 14/26/0688 234    • MPV 12/13/2022 10 9    • Sodium 12/13/2022 138    • Potassium 12/13/2022 4 1    • Chloride 12/13/2022 107    • CO2 12/13/2022 30    • ANION GAP 12/13/2022 1 (L)    • BUN 12/13/2022 11    • Creatinine 12/13/2022 1 00    • Glucose, Fasting 12/13/2022 91    • Calcium 12/13/2022 8 8    • Cholesterol 12/13/2022 178    • Triglycerides 12/13/2022 73    • HDL, Direct 12/13/2022 63    • LDL Calculated 12/13/2022 100    • TSH 3RD GENERATON 12/13/2022 1 900          Radiology Results:   No results found

## 2023-02-13 NOTE — PATIENT INSTRUCTIONS
Re-start Citrucel 1 tablespoon daily  You can also start Bentyl 10mg up to 3 x a day if no side effects such as dizziness/lightheadedness  In the future, you can try IBgard (peppermint supplement) 2-3 x a day with your meals

## 2023-02-23 ENCOUNTER — PROCEDURE VISIT (OUTPATIENT)
Dept: DERMATOLOGY | Facility: CLINIC | Age: 38
End: 2023-02-23

## 2023-02-23 DIAGNOSIS — L71.9 ROSACEA: Primary | ICD-10-CM

## 2023-02-23 NOTE — PATIENT INSTRUCTIONS
PATIENT'S AFTER-CARE INSTRUCTIONS:     Swelling may occur after the laser treatment  This may last for several days  A cool washcloth or ice pack can be applied if it helps him/her feel better  Bruising or purplish discoloration may be present for up to 2 weeks in the treated area  Keep the area moist with topical Aquaphor or petroleum jelly until the bruising resolves  Blistering is rare  If this occurs, generously apply bacitracin ointment until complete healing occurs  Encourage your child to rest and avoid activities that could result in injury to the treated skin  Your child can take a bath or shower the day after the procedure  Avoid rubbing or scratching the treated area  Avoid sun exposure after and between laser treatments  Sun exposure may cause pigmentation changes in the treated areas  In addition, sun exposure can darken and worsen red birthmarks  Tylenol may be given for any post-operative discomfort (pain is usually minimal)  If severe pain occurs, call our office at [phone] (SKIN); after hours or on the weekends, you will be connected to our on-call dermatology service

## 2023-02-23 NOTE — PROGRESS NOTES
2/23/2023    DO NOT BILL PATIENT OR INSURANCE   [employer] PAID $900 for "3+1" treatments; this is Treatment 4 of 4      Lela FLYNN (Flashlamp Pulsed-Dye  Treatment     PROCEDURE: Flashlamp Pulsed-Dye Laser Treatment  [facility] of Surgery:  [facility]  Surgeon and Miladis Carias  Assistant: Kristina  Photography:  Yes     After discussing potential procedure related risks including but not limited to pain, purpura, blistering, scarring, discoloration, “footprinting,” recurrence, inefficacy, need for additional treatment, and undesirable cosmetic written and verbal consent were obtained      Anesthetic: None     Safety Precautions:  Fire extinguisher present/Window covered/Staff and patient eyes covered/Warning sign posted     Treatment number: 4  Interim History/Comments:    Percent lightening:   Growth Phase:     Pre-operative Diagnosis: Rosacea  Indications for Surgery:  Cosmesis  Post-operative Diagnosis: same as pre-operative     Parameters:  The V Beam laser was set to 595nm wavelength   The cooling device was set to 30 msec duration/20 msec delay     Procedure Note:  After obtaining appropriate consent, patient was brought back to the operating room   Time out was performed   Patient's name, identification and intended procedure were verified  Eye coverings eye shield inserted/placed       The following anatomic locations were treated at the following PDL laser parameters of        10 mm Spot Size;  Fluence; 6 5 J (to cheeks and forehead and nose) and 7 J to Chin; 6 msec Pulse width; 224 pulses       ANATOMIC LOCATION:  Right and Left Cheek, Chin, Glabella/forehead; TOTAL AREA (Cm/2) TREATED:  >200 cm2           Tolerance: Well-tolerated  Complications:   Estimated Blood Loss:  0 cc   Other procedures:          Findings and plans were discussed with the family    Post-op Care: Clobetasol ointment today only; sun protecion "24/7/365"; no exercise or flushing for 48 hours  Disposition:  Discharged to home  Follow-up:  if needed     PATIENT'S AFTER-CARE INSTRUCTIONS:     • Swelling may occur after the laser treatment   This may last for several days   A cool washcloth or ice pack can be applied if it helps him/her feel better      • Bruising or purplish discoloration may be present for up to 2 weeks in the treated area  Keep the area moist with topical Aquaphor or petroleum jelly until the bruising resolves      • Blistering is rare   If this occurs, generously apply bacitracin ointment until complete healing occurs      • Encourage your child to rest and avoid activities that could result in injury to the treated skin      • Your child can take a bath or shower the day after the procedure   Avoid rubbing or scratching the treated area       • Avoid sun exposure after and between laser treatments   Sun exposure may cause pigmentation changes in the treated areas   In addition, sun exposure can darken and worsen red birthmarks      • Tylenol may be given for any post-operative discomfort (pain is usually minimal)    If severe pain occurs, call our office at [phone] (SKIN); after hours or on the weekends, you will be connected to our on-call dermatology service        Scribe Attestation    I,:  Manuel Chiang am acting as a scribe while in the presence of the attending physician :       I,:  Evita Murphy MD personally performed the services described in this documentation    as scribed in my presence :

## 2023-02-24 ENCOUNTER — TRANSCRIBE ORDERS (OUTPATIENT)
Dept: OBGYN CLINIC | Facility: CLINIC | Age: 38
End: 2023-02-24

## 2023-02-24 ENCOUNTER — TELEPHONE (OUTPATIENT)
Dept: OBGYN CLINIC | Facility: CLINIC | Age: 38
End: 2023-02-24

## 2023-02-24 DIAGNOSIS — N92.6 IRREGULAR MENSES: Primary | ICD-10-CM

## 2023-02-24 NOTE — TELEPHONE ENCOUNTER
Patient informed of recommendation to have pelvic ultrasound    Order will be placed in [facility] and will call office to schedule follow up visit when she had date for ultrasound

## 2023-02-24 NOTE — TELEPHONE ENCOUNTER
----- Message from Real Painting DO sent at 2/23/2023  4:31 PM EST -----  Regarding: FW: More Frequent Menstruation  Contact: [phone]  Inform patient recommend pelvic ultrasound and office visit follow-up  ----- Message -----  From: Robert Gandara  Sent: 2/23/2023   3:46 PM EST  To: Real Painting DO  Subject: FW: More Frequent Menstruation                     ----- Message -----  From: Paola Llanosestefania  Sent: 2/23/2023   3:21 PM EST  To: [facility]  Subject: More Frequent Menstruation                       Hi Dr Kary Cuellar,    My menstruation cycle is normally a little shorter (coming every 25/26 days) but over the past 3-4 months, I've been getting my period more frequently (every 21-23 days)  This happened to me before, about 5 years ago, 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 Not sure this would be a factor but I've had some ovarian and cervical cysts in the past (more often when I was on an IUD) and do have some family history of ovarian cysts  Let me know what you think      Thanks,  Suly Cabral

## 2023-03-07 ENCOUNTER — HOSPITAL ENCOUNTER (OUTPATIENT)
Dept: ULTRASOUND IMAGING | Facility: HOSPITAL | Age: 38
Discharge: HOME | End: 2023-03-07

## 2023-03-07 ENCOUNTER — OFFICE VISIT (OUTPATIENT)
Dept: INTERNAL MEDICINE CLINIC | Facility: CLINIC | Age: 38
End: 2023-03-07

## 2023-03-07 VITALS
WEIGHT: 132.6 LBS | BODY MASS INDEX: 24.4 KG/M2 | DIASTOLIC BLOOD PRESSURE: 70 MMHG | HEART RATE: 70 BPM | SYSTOLIC BLOOD PRESSURE: 118 MMHG | HEIGHT: 62 IN | OXYGEN SATURATION: 99 %

## 2023-03-07 DIAGNOSIS — Z00.00 ANNUAL PHYSICAL EXAM: Primary | ICD-10-CM

## 2023-03-07 DIAGNOSIS — R33.9 INCOMPLETE BLADDER EMPTYING: ICD-10-CM

## 2023-03-07 DIAGNOSIS — E03.9 ACQUIRED HYPOTHYROIDISM: ICD-10-CM

## 2023-03-07 DIAGNOSIS — N92.6 IRREGULAR MENSES: ICD-10-CM

## 2023-03-07 NOTE — PROGRESS NOTES
ADULT ANNUAL PHYSICAL  [facility]    NAME: Marly eJnnings  AGE: 30 y o  SEX: adult  : 1985     DATE: 3/7/2023     Assessment and Plan:     Problem List Items Addressed This Visit        Endocrine    Hypothyroid    Relevant Orders    TSH, 3rd generation with Free T4 reflex   Other Visit Diagnoses     Annual physical exam    -  Primary    Incomplete bladder emptying        Relevant Orders    US kidney and bladder          Immunizations and preventive care screenings were discussed with patient today  Appropriate education was printed on patient's after visit summary  Counseling:  Obtain TSH in the next 6months  Schedule bladder US if feeling of incomplete emptying continues and pelvic US is normal         No follow-ups on file  Chief Complaint:     Chief Complaint   Patient presents with   • Annual Exam      History of Present Illness:     Adult Annual Physical   Patient here for a comprehensive physical exam  The patient reports moving to [facility] soon  She will be working for [id number] and Physical Activity  Diet/Nutrition: well balanced diet  Exercise: 3-4 times a week on average  Depression Screening  PHQ-2/9 Depression Screening    Little interest or pleasure in doing things: 0 - not at all  Feeling down, depressed, or hopeless: 1 - several days       General Health  Sleep: sleeps well  Hearing: normal - bilateral   Vision: goes for regular eye exams  Dental: regular dental visits  /GYN Health  Last menstrual period: 21 day cycle recently     Review of Systems:     Review of Systems   Constitutional: Negative for chills, fatigue, fever and unexpected weight change  HENT: Negative for hearing loss  Respiratory: Negative for cough and shortness of breath  Cardiovascular: Positive for chest pain (left anterior lower rib cage at night with deep breaths once a month for a few months)   Negative for palpitations and leg swelling  Gastrointestinal: Positive for diarrhea  Negative for abdominal pain, constipation, nausea and vomiting  Genitourinary: Positive for difficulty urinating (harder to empty bladder ) and menstrual problem (shorter more frequent menses)  Negative for dysuria and hematuria  Musculoskeletal: Negative for arthralgias and myalgias  Neurological: Negative for dizziness and headaches  Past Medical History:     Past Medical History:   Diagnosis Date   • Acne    • Anxiety    • Depression 2002   • Deviated septum    • Eating disorder 2000   • Eczema    • History of mononucleosis    • Migraine with aura and without status migrainosus, not intractable    • Ovarian cyst    • Wears contact lenses    • Wears glasses       Past Surgical History:     Past Surgical History:   Procedure Laterality Date   • COLONOSCOPY     • FOOT SURGERY Left 2020    cysts   • HIP SURGERY  2017   • MANDIBLE SURGERY  2006   • REPAIR LABRUM Left 2017   • WISDOM TOOTH EXTRACTION  2008      Social History:     Social History     Socioeconomic History   • Marital status: Single     Spouse name: None   • Number of children: 0   • Years of education: None   • Highest education level: None   Occupational History   • Occupation: Research   Tobacco Use   • Smoking status: Never   • Smokeless tobacco: Never   Vaping Use   • Vaping Use: Never used   Substance and Sexual Activity   • Alcohol use:  Yes     Alcohol/week: 4 0 standard drinks     Types: 4 Glasses of wine per week     Comment: social   • Drug use: Yes     Types: Marijuana     Comment: Rarely (less than 1x month)   • Sexual activity: Not Currently     Partners: Female, Male     Birth control/protection: Condom Male, Other     Comment: Other - Phexxi vaginal gel   Other Topics Concern   • None   Social History Narrative    Who lives in your home: Parents     What type of home do you live in: Single house    Age of your home: Built 20 yrs ago     How long have you been living there: 18 months     Type of heat: Forced hot air    Type of fuel: Gas and Electric    What type of rose is in your bedroom: Carpet    Do you have the following in or near your home:    Air products: Central air, Humidifier, and Dehumidifier    Pests: None    Pets: Dog    Are pets allowed in bedroom: Yes    Open fields, wooded areas nearby: Open fields and Wooded areas    Basement: Unfinished    Exposure to second hand smoke: No        Habits:    Caffeine: 1 cup coffee daily-hot tea occasionally     Chocolate: 2 x a month     Other:              Social Determinants of Health     Financial Resource Strain: Not on file   Food Insecurity: Not on file   Transportation Needs: Not on file   Physical Activity: Sufficiently Active   • Days of Exercise per Week: 4 days   • Minutes of Exercise per Session: 40 min   Stress: Not on file   Social Connections: Not on file   Intimate Partner Violence: Not on file   Housing Stability: Not on file      Family History:     Family History   Problem Relation Age of Onset   • Glaucoma Mother    • No Known Problems Father    • Skin cancer Paternal Grandmother    • Lung cancer Paternal Grandmother    • Coronary artery disease Paternal Grandmother    • Cancer Paternal Grandmother         skin, lung   • Anxiety disorder Paternal Grandmother    • Multiple myeloma Maternal Aunt    • Colon cancer Maternal Uncle    • Coronary artery disease Maternal Grandmother    • Heart disease Maternal Grandmother    • No Known Problems Sister    • Alcohol abuse Paternal Grandfather    • Diabetes Paternal Grandfather    • Celiac disease Family       Current Medications:     Current Outpatient Medications   Medication Sig Dispense Refill   • dicyclomine (BENTYL) 10 mg capsule Take 1 capsule (10 mg total) by mouth 3 (three) times a day as needed (abdominal cramping, diarrhea) 90 capsule 2   • levothyroxine (Euthyrox) 88 mcg tablet Take 1 tablet (88 mcg total) by mouth daily 90 tablet 3   • Melatonin Gummies 2 5 MG CHEW Chew If needed     • Multiple Vitamin (multivitamin) tablet Take 1 tablet by mouth daily     • ondansetron (ZOFRAN-ODT) 4 mg disintegrating tablet Take 1 tablet (4 mg total) by mouth every 6 (six) hours as needed for nausea or vomiting 10 tablet 0   • Phexxi 1 8-1-0 4 % GEL INSERT 1 APPLICATOR INTO THE VAGINA DAILY IMMEDIATELY PRIOR TO INTERCOURSE     • Soolantra 1 % CREA Apply topically twice a day 45 g 11   • spironolactone (ALDACTONE) 25 mg tablet Take 1 tablet (25 mg total) by mouth 2 (two) times a day 180 tablet 3   • SUMAtriptan (IMITREX) 100 mg tablet TAKE 1 TABLET (100 MG TOTAL) BY MOUTH ONCE AS NEEDED FOR MIGRAINE FOR UP TO 1 DOSE 10 tablet 0   • tretinoin (RETIN-A) 0 025 % cream Apply topically in the morning       No current facility-administered medications for this visit  Allergies: Allergies   Allergen Reactions   • Bactrim [Sulfamethoxazole-Trimethoprim] Rash      Physical Exam:     /70 (BP Location: Left arm, Patient Position: Sitting, Cuff Size: Standard)   Pulse 70   Ht 5' 2" (1 575 m)   Wt 60 1 kg (132 lb 9 6 oz)   SpO2 99%   BMI 24 25 kg/m²     Physical Exam  Constitutional:       General: Laura Mu is not in acute distress  Appearance: Laura Mu is well-developed  Laura Mu is not ill-appearing, toxic-appearing or diaphoretic  HENT:      Head: Normocephalic and atraumatic  Right Ear: External ear normal  There is no impacted cerumen  Left Ear: External ear normal  There is no impacted cerumen  Eyes:      Conjunctiva/sclera: Conjunctivae normal    Cardiovascular:      Rate and Rhythm: Normal rate and regular rhythm  Heart sounds: Normal heart sounds  No murmur heard  Pulmonary:      Effort: Pulmonary effort is normal  No respiratory distress  Breath sounds: Normal breath sounds  No stridor  No wheezing or rales  Abdominal:      General: There is no distension  Palpations: Abdomen is soft   There is no mass  Tenderness: There is no abdominal tenderness  There is no guarding or rebound  Musculoskeletal:      Cervical back: Neck supple  Right lower leg: No edema  Left lower leg: No edema  Neurological:      Mental Status: Alyssa Godwin is alert and oriented to person, place, and time  Psychiatric:         Mood and Affect: Mood normal          Behavior: Behavior normal          Thought Content:  Thought content normal          Judgment: Judgment normal           Scot Whatley MD   [facility]

## 2023-03-09 ENCOUNTER — OFFICE VISIT (OUTPATIENT)
Dept: DERMATOLOGY | Facility: CLINIC | Age: 38
End: 2023-03-09

## 2023-03-09 VITALS — WEIGHT: 132 LBS | HEIGHT: 62 IN | BODY MASS INDEX: 24.29 KG/M2 | TEMPERATURE: 97.5 F

## 2023-03-09 DIAGNOSIS — L60.9 NAIL DISORDER: Primary | ICD-10-CM

## 2023-03-09 NOTE — PATIENT INSTRUCTIONS
ONYCHOMYCOSIS ("FUNGAL NAIL")    Assessment and Plan:  Based on a thorough discussion of this condition and the management approach to it (including a comprehensive discussion of the known risks, side effects and potential benefits of treatment), the patient (family) agrees to implement the following specific plan:  [employer]: [email or web address]; [phone]  Apply prescribed medication once daily  to nail for up to 3 months      [facility]     We use this service to prescribe medications that are often not covered by insurance  Your physician will send your prescription to the pharmacy  You will receive an email from [email or web address] where you will follow the instructions within the email to provide your billing and shipping information  Your medicine will be shipped directly to you  If you have any questions, you can call [phone] or email [email or web address]    What are fungal nail infections? Fungal infection of the nails is also known as onychomycosis  It is increasingly common with increased age  It rarely affects children  Which organisms cause onychomycosis? Onychomycosis can be due to:  Dermatophytes such as Trichophyton rubrum (T  rubrum), T  interdigitale (tinea unguium)   Yeasts such as Candida albicans   Molds such as Scopulariopsis brevicaulis and Fusarium species  What are the clinical features of onychomycosis? Onychomycosis may affect one or more toenails and fingernails and most often involves the great toenail or the little toenail  It can present in one or several different patterns  Lateral onychomycosis: a white or yellow opaque streak appears at one side of the nail  Subungual hyperkeratosis: scaling occurs under the nail  Distal onycholysis: the end of the nail lifts  The free edge often crumbles  Superficial white onychomycosis: flaky white patches and pits appear on the top of the nail plate     Proximal onychomycosis:yellow spots appear in the half-moon (lunula)  Onychoma or dermatophytoma:a thick localized area of infection in the nail plate   Destruction of the nail    Tinea unguium often results from untreated tinea pedis (feet) or tinea manuum (hand)  It may follow an injury to the nail or inflammatory disease of the nail  Candida infection of the nail plate generally results from paronychia and starts near the nail fold (the cuticle)  The nail fold is swollen and red, lifted off the nail plate  White, yellow, green or black marks appear on the nearby nail and spread  The nail may lift off its bed and is tender if you press on it  Mold infections are similar in appearance to tinea unguium  Onychomycosis must be distinguished from other nail disorders  Bacterial infection especially Pseudomonas aeruginosa, which turns the nail black or green   Psoriasis   Eczema or dermatitis   Lichen planus   Viral warts   Onycholysis   Onychogryphosis (nail thickening and scaling under the nail), common in the elderly    How is the diagnosis of onychomycosis confirmed? Clippings should be taken from crumbling tissue at the end of the infected nail  The discolored surface of the nails can be scraped off  The debris can be scooped out from under the nail  The clippings and scrapings are sent to a mycology laboratory for microscopy and culture  Previous treatment can reduce the chance of growing the fungus successfully in a culture, so it is best to take the clippings before any treatment is commenced: To confirm the diagnosis -- antifungal treatment will not be successful if there is another explanation for the nail condition   To identify the responsible organism  Molds and yeasts may require different treatment from dermatophyte fungi   Treatment may be required for a prolonged period and is expensive  Partially treated infection may be impossible to prove for many months as antifungal drugs can be detected even a year later    A nail biopsy may also reveal characteristic histopathological features of onychomycosis  What is the treatment of onychomycosis? Fingernail infections are usually cured more quickly and effectively than toenail infections  Mild infections affecting less than 50% of one or two nails may respond to topical antifungal medications, but cure usually requires an oral antifungal medication for several months  Devices used to treat onychomycosis  Recently, non-drug treatment has been developed to treat onychomycosis thus avoiding the side effects and risks of oral antifungal drugs  Lasers emitting infrared radiation are thought to kill fungi by the production of heat within the infected tissue  Laser treatment is reported to safely eradicate nail fungi with one to three, almost painless, sessions  Several lasers have been approved for this purpose by the FDA and other regulatory authorities  However, high-quality studies of efficacy are lacking, and existing studies indicate that laser treatment is less medically effective than topical or oral antifungal agents    Nd:YAG continuous, long or short-pulsed lasers   Ti:Sapphire modelocked laser   Diode laser

## 2023-03-09 NOTE — PROGRESS NOTES
[facility] Note     Patient Name: Jonah Maravilla  Encounter Date: 3/9/23    Have you been cared for by a [place] [address] Dermatologist in the last 3 years and, if so, which description applies to you? Yes  I have been here within the last 3 years, and my medical history has NOT changed since that time  I am FEMALE/of child-bearing potential     REVIEW OF SYSTEMS:  Have you recently had or currently have any of the following? · No changes in my recent health  PAST MEDICAL HISTORY:  Have you personally ever had or currently have any of the following? If "YES," then please provide more detail  · No changes in my medical history  FAMILY HISTORY:  Any "first degree relatives" (parent, brother, sister, or child) with the following? • No changes in my family's known health  PATIENT EXPERIENCE:    • Do you want the Dermatologist to perform a COMPLETE skin exam today including a clinical examination under the "bra and underwear" areas? NO  • If necessary, do we have your permission to call and leave a detailed message on your Preferred Phone number that includes your specific medical information?   Yes      Allergies   Allergen Reactions   • Bactrim [Sulfamethoxazole-Trimethoprim] Rash      Current Outpatient Medications:   •  dicyclomine (BENTYL) 10 mg capsule, Take 1 capsule (10 mg total) by mouth 3 (three) times a day as needed (abdominal cramping, diarrhea), Disp: 90 capsule, Rfl: 2  •  levothyroxine (Euthyrox) 88 mcg tablet, Take 1 tablet (88 mcg total) by mouth daily, Disp: 90 tablet, Rfl: 3  •  Melatonin Gummies 2 5 MG CHEW, Chew If needed, Disp: , Rfl:   •  Multiple Vitamin (multivitamin) tablet, Take 1 tablet by mouth daily, Disp: , Rfl:   •  ondansetron (ZOFRAN-ODT) 4 mg disintegrating tablet, Take 1 tablet (4 mg total) by mouth every 6 (six) hours as needed for nausea or vomiting, Disp: 10 tablet, Rfl: 0  •  Phexxi 1 8-1-0 4 % GEL, INSERT 1 APPLICATOR INTO THE VAGINA DAILY IMMEDIATELY PRIOR TO INTERCOURSE, Disp: , Rfl:   •  Soolantra 1 % CREA, Apply topically twice a day, Disp: 45 g, Rfl: 11  •  spironolactone (ALDACTONE) 25 mg tablet, Take 1 tablet (25 mg total) by mouth 2 (two) times a day, Disp: 180 tablet, Rfl: 3  •  SUMAtriptan (IMITREX) 100 mg tablet, TAKE 1 TABLET (100 MG TOTAL) BY MOUTH ONCE AS NEEDED FOR MIGRAINE FOR UP TO 1 DOSE, Disp: 10 tablet, Rfl: 0  •  tretinoin (RETIN-A) 0 025 % cream, Apply topically in the morning, Disp: , Rfl:           • Whom besides the patient is providing clinical information about today's encounter?   o NO ADDITIONAL HISTORIAN (patient alone provided history)    Physical Exam and Assessment/Plan by Diagnosis:      NAIL DISORDER, POSSIBLE ONYCHOMYCOSIS ("FUNGAL NAIL")    Physical Exam:  • Anatomic Location Affected:  Left first toenail  • Morphological Description:  See photo  • Pertinent Positives:  • Pertinent Negatives: Additional History of Present Condition:  Patient noted that had once before back in 2018 and was prescribed to paint ointment on but doesn't remember name    Assessment and Plan:  Based on a thorough discussion of this condition and the management approach to it (including a comprehensive discussion of the known risks, side effects and potential benefits of treatment), the patient (family) agrees to implement the following specific plan:  • [employer]: [email or web address]; [phone]  • Apply prescribed medication once daily  to nail for up to 3 months      [facility]     We use this service to prescribe medications that are often not covered by insurance  Your physician will send your prescription to the pharmacy  You will receive an email from [email or web address] where you will follow the instructions within the email to provide your billing and shipping information  Your medicine will be shipped directly to you      If you have any questions, you can call [phone] or email [email or web address]  Ciclopirox: 8%  Itraconazole: 3%  Fluconazole: 3%  Terbinafine HCl: 1%  Ibuprofen: 2%  DMSO:  Vehicle: Suspension         What are fungal nail infections? Fungal infection of the nails is also known as onychomycosis  It is increasingly common with increased age  It rarely affects children  Which organisms cause onychomycosis? Onychomycosis can be due to:  Dermatophytes such as Trichophyton rubrum (T  rubrum), T  interdigitale (tinea unguium)   Yeasts such as Candida albicans   Molds such as Scopulariopsis brevicaulis and Fusarium species  What are the clinical features of onychomycosis? Onychomycosis may affect one or more toenails and fingernails and most often involves the great toenail or the little toenail  It can present in one or several different patterns  • Lateral onychomycosis: a white or yellow opaque streak appears at one side of the nail  • Subungual hyperkeratosis: scaling occurs under the nail  • Distal onycholysis: the end of the nail lifts  The free edge often crumbles  • Superficial white onychomycosis: flaky white patches and pits appear on the top of the nail plate  • Proximal onychomycosis:yellow spots appear in the half-moon (lunula)  • Onychoma or dermatophytoma:a thick localized area of infection in the nail plate   • Destruction of the nail    Tinea unguium often results from untreated tinea pedis (feet) or tinea manuum (hand)  It may follow an injury to the nail or inflammatory disease of the nail  Candida infection of the nail plate generally results from paronychia and starts near the nail fold (the cuticle)  The nail fold is swollen and red, lifted off the nail plate  White, yellow, green or black marks appear on the nearby nail and spread  The nail may lift off its bed and is tender if you press on it  Mold infections are similar in appearance to tinea unguium    Onychomycosis must be distinguished from other nail disorders  • Bacterial infection especially Pseudomonas aeruginosa, which turns the nail black or green   • Psoriasis   • Eczema or dermatitis   • Lichen planus   • Viral warts   • Onycholysis   • Onychogryphosis (nail thickening and scaling under the nail), common in the elderly    How is the diagnosis of onychomycosis confirmed? Clippings should be taken from crumbling tissue at the end of the infected nail  The discolored surface of the nails can be scraped off  The debris can be scooped out from under the nail  The clippings and scrapings are sent to a mycology laboratory for microscopy and culture  Previous treatment can reduce the chance of growing the fungus successfully in a culture, so it is best to take the clippings before any treatment is commenced:  • To confirm the diagnosis -- antifungal treatment will not be successful if there is another explanation for the nail condition   • To identify the responsible organism  Molds and yeasts may require different treatment from dermatophyte fungi   Treatment may be required for a prolonged period and is expensive  Partially treated infection may be impossible to prove for many months as antifungal drugs can be detected even a year later  A nail biopsy may also reveal characteristic histopathological features of onychomycosis  What is the treatment of onychomycosis? Fingernail infections are usually cured more quickly and effectively than toenail infections  Mild infections affecting less than 50% of one or two nails may respond to topical antifungal medications, but cure usually requires an oral antifungal medication for several months  Devices used to treat onychomycosis  Recently, non-drug treatment has been developed to treat onychomycosis thus avoiding the side effects and risks of oral antifungal drugs  Lasers emitting infrared radiation are thought to kill fungi by the production of heat within the infected tissue   Laser treatment is reported to safely eradicate nail fungi with one to three, almost painless, sessions  Several lasers have been approved for this purpose by the FDA and other regulatory authorities  However, high-quality studies of efficacy are lacking, and existing studies indicate that laser treatment is less medically effective than topical or oral antifungal agents    Nd:YAG continuous, long or short-pulsed lasers   Ti:Sapphire modelocked laser   Diode laser        Scribe Attestation    I,:  Robert Adkins am acting as a scribe while in the presence of the attending physician :       I,:  Ad Wren MD personally performed the services described in this documentation    as scribed in my presence :

## 2023-03-13 ENCOUNTER — TELEPHONE (OUTPATIENT)
Dept: OBGYN CLINIC | Facility: CLINIC | Age: 38
End: 2023-03-13

## 2023-03-13 NOTE — TELEPHONE ENCOUNTER
Contacted patient to reschedule apt due to provider being out of office  She is requesting a review of her most recent ultrasound and possibly a phone call with recommendations    She will be moving at the end of this week and changing insurance plans and understands that she may need to follow up with new provider closer to where she will be moving

## 2023-03-14 ENCOUNTER — OFFICE VISIT (OUTPATIENT)
Dept: OBGYN CLINIC | Facility: CLINIC | Age: 38
End: 2023-03-14

## 2023-03-14 VITALS
WEIGHT: 133 LBS | HEIGHT: 62 IN | BODY MASS INDEX: 24.48 KG/M2 | DIASTOLIC BLOOD PRESSURE: 70 MMHG | SYSTOLIC BLOOD PRESSURE: 110 MMHG

## 2023-03-14 DIAGNOSIS — N92.6 IRREGULAR MENSES: Primary | ICD-10-CM

## 2023-03-14 DIAGNOSIS — N83.201 CYST OF RIGHT OVARY: ICD-10-CM

## 2023-03-14 NOTE — PROGRESS NOTES
Assessment/Plan:  Reviewed menstrual diary  Also discussed recent pelvic ultrasound on 3/7 revealing right 2 cm corpus luteum cyst otherwise normal ultrasound  Implications reviewed  Patient aware cycle control options are limited given history of migraine headaches  At this point she would like to monitor her cycle  Discussed repeating pelvic ultrasound in 2 to 3 months if discomfort not improved  At this point she will remain conservative  She will resume annual GYN exam 10/2023  All questions answered at this time  No problem-specific Assessment & Plan notes found for this encounter  Diagnoses and all orders for this visit:    Irregular menses    Cyst of right ovary          Subjective:      Patient ID: Wilfrido Brown is a 30 y o  adult  HPI     This is a pleasant 51-year-old female [de-identified], accompanied with her mother presents for follow-up irregular menses  Her menstrual cycles are usually every 25 to 26 days  She has had a 21-day cycle lasting 5 days with no breakthrough bleeding  She is also had some slight discomfort/bladder intermittently over the last 3 months  There is been no changes in bowel or bladder function  She does have history of ovarian cyst   She is also been under a lot of stress including new job end of this month, relocating to [place]  She does have a history of migraine headaches    With aura  She has avoided all estrogen products  Other forms of contraception including progesterone IUD with side effects of acne  The following portions of the patient's history were reviewed and updated as appropriate: allergies, current medications, past family history, past medical history, past social history, past surgical history and problem list     Review of Systems   Constitutional: Negative for fatigue, fever and unexpected weight change  Respiratory: Negative for cough, chest tightness, shortness of breath and wheezing  Cardiovascular: Negative    Negative for chest pain and palpitations  Gastrointestinal: Negative  Negative for abdominal distention, abdominal pain, blood in stool, constipation, diarrhea, nausea and vomiting  Genitourinary: Positive for menstrual problem  Negative for difficulty urinating, dyspareunia, dysuria, flank pain, frequency, genital sores, hematuria, pelvic pain, urgency, vaginal bleeding, vaginal discharge and vaginal pain  Skin: Negative for rash  Objective:      /70   Ht 5' 2" (1 575 m)   Wt 60 3 kg (133 lb)   LMP 02/20/2023   BMI 24 33 kg/m²          Physical Exam        I have spent a total time of 30 minutes on 03/14/23 in caring for this patient including Instructions for management

## 2023-04-24 DIAGNOSIS — E03.9 HYPOTHYROIDISM, UNSPECIFIED TYPE: ICD-10-CM

## 2023-04-24 RX ORDER — LEVOTHYROXINE SODIUM 88 UG/1
88 TABLET ORAL DAILY
Qty: 90 TABLET | Refills: 3 | Status: SHIPPED | OUTPATIENT
Start: 2023-04-24 | End: 2023-04-24

## 2023-04-24 RX ORDER — LEVOTHYROXINE SODIUM 88 UG/1
88 TABLET ORAL DAILY
Qty: 90 TABLET | Refills: 3 | Status: SHIPPED | OUTPATIENT
Start: 2023-04-24

## 2023-05-03 DIAGNOSIS — L25.9 CONTACT DERMATITIS, UNSPECIFIED CONTACT DERMATITIS TYPE, UNSPECIFIED TRIGGER: Primary | ICD-10-CM

## 2023-07-14 ENCOUNTER — NURSE TRIAGE (OUTPATIENT)
Age: 38
End: 2023-07-14

## 2023-07-14 NOTE — TELEPHONE ENCOUNTER
FYI   Last OV: 2/13/23 Deanna POLK     Spoke with patient, reports she recently moved to [facility] and does not have a GI set up there or PCP. She was seen by telemedicine provider through [address] [place] [address] for diarrhea and was prescribed antibx on 7/1/23. She felt better instantly but then this week her diarrhea came back going 2-4 times a day with abdominal cramping and urgency. Patient is not currently on bentyl or fiber supplements. She submitted stool samples yesterday from [facility] medicine and waiting for those results. She will go back on bentyl 3 times a day and fiber supplement daily as advised at last OV. Advised to stay hydrated with Gatorade, Pedialyte, bland/ BRAT diet and follow up with stool samples. She understood and is trying to find a PCP and GI near [facility].

## 2023-07-14 NOTE — TELEPHONE ENCOUNTER
Reason for Disposition  • Recent antibiotic therapy (i.e., within last 2 months) and diarrhea present > 3 days since antibiotic was stopped    Protocols used: DIARRHEA-ADULT-[place]
